# Patient Record
Sex: MALE | Race: WHITE | NOT HISPANIC OR LATINO | URBAN - METROPOLITAN AREA
[De-identification: names, ages, dates, MRNs, and addresses within clinical notes are randomized per-mention and may not be internally consistent; named-entity substitution may affect disease eponyms.]

---

## 2020-04-01 ENCOUNTER — OUTPATIENT (OUTPATIENT)
Dept: OUTPATIENT SERVICES | Facility: HOSPITAL | Age: 50
LOS: 1 days | End: 2020-04-01
Payer: MEDICAID

## 2020-04-01 PROCEDURE — G9001: CPT

## 2020-04-16 ENCOUNTER — EMERGENCY (EMERGENCY)
Facility: HOSPITAL | Age: 50
LOS: 1 days | Discharge: HOME | End: 2020-04-16
Admitting: EMERGENCY MEDICINE
Payer: COMMERCIAL

## 2020-04-16 VITALS
TEMPERATURE: 98 F | WEIGHT: 164.91 LBS | HEART RATE: 63 BPM | HEIGHT: 70 IN | SYSTOLIC BLOOD PRESSURE: 147 MMHG | DIASTOLIC BLOOD PRESSURE: 87 MMHG | RESPIRATION RATE: 20 BRPM | OXYGEN SATURATION: 97 %

## 2020-04-16 VITALS
HEART RATE: 58 BPM | OXYGEN SATURATION: 98 % | TEMPERATURE: 98 F | RESPIRATION RATE: 18 BRPM | SYSTOLIC BLOOD PRESSURE: 117 MMHG | DIASTOLIC BLOOD PRESSURE: 67 MMHG

## 2020-04-16 LAB — SARS-COV-2 RNA SPEC QL NAA+PROBE: DETECTED

## 2020-04-16 PROCEDURE — 87635 SARS-COV-2 COVID-19 AMP PRB: CPT

## 2020-04-16 PROCEDURE — 99283 EMERGENCY DEPT VISIT LOW MDM: CPT

## 2020-04-16 PROCEDURE — 99284 EMERGENCY DEPT VISIT MOD MDM: CPT

## 2020-04-16 RX ORDER — METHADONE HYDROCHLORIDE 40 MG/1
100 TABLET ORAL ONCE
Refills: 0 | Status: DISCONTINUED | OUTPATIENT
Start: 2020-04-16 | End: 2020-04-16

## 2020-04-16 RX ORDER — ACETAMINOPHEN 500 MG
650 TABLET ORAL ONCE
Refills: 0 | Status: COMPLETED | OUTPATIENT
Start: 2020-04-16 | End: 2020-04-16

## 2020-04-16 RX ADMIN — Medication 650 MILLIGRAM(S): at 10:57

## 2020-04-16 RX ADMIN — METHADONE HYDROCHLORIDE 100 MILLIGRAM(S): 40 TABLET ORAL at 13:44

## 2020-04-16 NOTE — ED ADULT TRIAGE NOTE - CHIEF COMPLAINT QUOTE
pt states " im here because the methadone program wants me to be tested for covid, other wise they won't see me" pt denies any cough, fever, chills.

## 2020-04-16 NOTE — ED PROVIDER NOTE - OBJECTIVE STATEMENT
51 y/o M with PMHx opioid abuse, on methadone maintenance 100mg, undomiciled, presenting to the ED requesting COVID-19 testing. Pt states he lives in a homeless shelter and states he went to the methadone clinic, but they will not administer his meds until he receives COVID-19 testing, as well as negative results. Pt is unable to receive methadone to go home with because he lives in a shelter. Pt is worried about withdrawal symptoms, but is not experiencing any at this time. He states his anxiety has worsened, and his last dose was 2 days ago. Denies fever, chills, cough, SOB, chest pain, abdominal pain, nausea, vomiting, diarrhea.

## 2020-04-16 NOTE — ED ADULT NURSE NOTE - OBJECTIVE STATEMENT
Patient states she was sent by his methadone program for covid testing. He also states today will be his 3rd day without methadone. Patient currently aaox3. Walks with steady gait. No tremors noted. He states +chills and subjective fever x 2 days. Per patient "It feels like it's from not having my methadone." He denies cough, SOB, chest pain, dizziness.

## 2020-04-16 NOTE — ED PROVIDER NOTE - NSFOLLOWUPINSTRUCTIONS_ED_ALL_ED_FT
Methadone (Injection) (Injectable) - Med Essential Fact Sheet     Rx static image Methadone - (By injection)   Why this medicine is used:  Treats pain.    Contact a nurse or doctor right away if you have:  Extreme dizziness or weakness, shallow breathing, sweating, seizures, cold or clammy skin  Slow, fast, pounding, or uneven heartbeat, trouble breathing  Decrease in how much or how often you urinate, swelling  Dry mouth, increased thirst, muscle cramps, fainting, unusual bleeding, bruising       Common side effects:  Vision changes, headache or confusion, lightheadedness, dizziness, mood changes  Trouble sleeping, changes in menstrual cycle, problems having sex  Nausea, vomiting, constipation, or stomach pain, loss of appetite, weight gain  Skin rash or itching, warmth or redness in your face, neck, arms, or upper chest       © Copyright [employer] 2020     back to top                      © Copyright [employer] 2020 Rx static image Methadone - (By injection)   Why this medicine is used:  Treats pain.    Contact a nurse or doctor right away if you have:  Extreme dizziness or weakness, shallow breathing, sweating, seizures, cold or clammy skin  Slow, fast, pounding, or uneven heartbeat, trouble breathing  Decrease in how much or how often you urinate, swelling  Dry mouth, increased thirst, muscle cramps, fainting, unusual bleeding, bruising       Common side effects:  Vision changes, headache or confusion, lightheadedness, dizziness, mood changes  Trouble sleeping, changes in menstrual cycle, problems having sex  Nausea, vomiting, constipation, or stomach pain, loss of appetite, weight gain  Skin rash or itching, warmth or redness in your face, neck, arms, or upper chest       © Copyright [employer] 2020     back to top                      © Copyright [employer] 2020

## 2020-04-16 NOTE — ED PROVIDER NOTE - PATIENT PORTAL LINK FT
You can access the FollowMyHealth Patient Portal offered by [facility] by registering at the following website: [email or web address]. By joining [email or web address]’s FollowMyHealth portal, you will also be able to view your health information using other applications (apps) compatible with our system.

## 2020-04-17 DIAGNOSIS — Z71.89 OTHER SPECIFIED COUNSELING: ICD-10-CM

## 2020-04-20 DIAGNOSIS — Z00.8 ENCOUNTER FOR OTHER GENERAL EXAMINATION: ICD-10-CM

## 2020-05-03 ENCOUNTER — EMERGENCY (EMERGENCY)
Facility: HOSPITAL | Age: 50
LOS: 1 days | Discharge: HOME | End: 2020-05-03
Attending: EMERGENCY MEDICINE | Admitting: EMERGENCY MEDICINE
Payer: MEDICAID

## 2020-05-03 VITALS
SYSTOLIC BLOOD PRESSURE: 116 MMHG | DIASTOLIC BLOOD PRESSURE: 83 MMHG | HEART RATE: 93 BPM | TEMPERATURE: 98 F | WEIGHT: 164.91 LBS | OXYGEN SATURATION: 95 % | RESPIRATION RATE: 18 BRPM

## 2020-05-03 PROCEDURE — 99283 EMERGENCY DEPT VISIT LOW MDM: CPT

## 2020-05-03 PROCEDURE — 99284 EMERGENCY DEPT VISIT MOD MDM: CPT

## 2020-05-03 PROCEDURE — 82962 GLUCOSE BLOOD TEST: CPT

## 2020-05-03 RX ADMIN — Medication 50 MILLIGRAM(S): at 18:01

## 2020-05-03 NOTE — ED PROVIDER NOTE - PATIENT PORTAL LINK FT
You can access the FollowMyHealth Patient Portal offered by [facility] by registering at the following website: [email or web address]. By joining [employer]’s FollowMyHealth portal, you will also be able to view your health information using other applications (apps) compatible with our system.

## 2020-05-03 NOTE — ED PROVIDER NOTE - CLINICAL SUMMARY MEDICAL DECISION MAKING FREE TEXT BOX
pt chronic alcoholic, drank 4 beers today - states usually drinks 1pint/day, wanting detox info, no objective findings of withdrawal - no n/v/d, no tachycardia or hypertension, no si/hi, denies drug use but admits to methadone, given librium to prevent withdrawal symptoms and given detox info, fs stable, no signs of trauma, no indication for any imaging or labs at this time, pt understands and agrees w/plan, strict return precautions given

## 2020-05-03 NOTE — ED PROVIDER NOTE - ENMT, MLM
Airway patent, Nasal mucosa clear. Mouth with normal mucosa. Throat has no vesicles, no oropharyngeal exudates and uvula is midline. no tongue fasciculations

## 2020-05-03 NOTE — ED PROVIDER NOTE - PROGRESS NOTE DETAILS
after dc - pt then demanding  help "to get some money" - explained that is not an option from the ed, then wanting admission to a hosp because homeless, pt escalating and becoming belligerent and aggressive towards staff, disruptive to surrounding patients hence escorted out by security

## 2020-05-03 NOTE — ED PROVIDER NOTE - ATTENDING CONTRIBUTION TO CARE
51yo M last drink few hours ago, alcoholic, here for concern for development of withdrawal (denies symptoms yet but wants librium to prevent) and detox info.   vss, well appearing, disheveled, no tongue fasciculations, no tremors, ambulatory without issue.  will give info and dose of librium and dc.

## 2020-05-03 NOTE — ED PROVIDER NOTE - OBJECTIVE STATEMENT
The pt is a 50 M, who presents to ED requesting detox info and states "I just wanted to make sure I'm not in withdrawal" - had 4 beers this am, states that usually drinks 1pint/day, states "i'm nervous". Denies cp, sob, n/v/d, abd pain, h/a, tremors, h/a, falls.

## 2020-05-04 PROBLEM — F11.10 OPIOID ABUSE, UNCOMPLICATED: Chronic | Status: ACTIVE | Noted: 2020-04-16

## 2020-05-07 DIAGNOSIS — R41.82 ALTERED MENTAL STATUS, UNSPECIFIED: ICD-10-CM

## 2020-05-07 DIAGNOSIS — F10.10 ALCOHOL ABUSE, UNCOMPLICATED: ICD-10-CM

## 2020-10-31 ENCOUNTER — EMERGENCY (EMERGENCY)
Facility: HOSPITAL | Age: 50
LOS: 1 days | Discharge: HOME | End: 2020-10-31
Attending: EMERGENCY MEDICINE | Admitting: EMERGENCY MEDICINE
Payer: MEDICAID

## 2020-10-31 VITALS
SYSTOLIC BLOOD PRESSURE: 138 MMHG | TEMPERATURE: 98 F | HEART RATE: 90 BPM | WEIGHT: 160.06 LBS | DIASTOLIC BLOOD PRESSURE: 88 MMHG | OXYGEN SATURATION: 99 % | HEIGHT: 70 IN | RESPIRATION RATE: 18 BRPM

## 2020-10-31 DIAGNOSIS — I10 ESSENTIAL (PRIMARY) HYPERTENSION: ICD-10-CM

## 2020-10-31 DIAGNOSIS — M79.674 PAIN IN RIGHT TOE(S): ICD-10-CM

## 2020-10-31 DIAGNOSIS — Z79.899 OTHER LONG TERM (CURRENT) DRUG THERAPY: ICD-10-CM

## 2020-10-31 PROCEDURE — 73630 X-RAY EXAM OF FOOT: CPT | Mod: 26

## 2020-10-31 PROCEDURE — 73630 X-RAY EXAM OF FOOT: CPT | Mod: 26,RT

## 2020-10-31 PROCEDURE — 73630 X-RAY EXAM OF FOOT: CPT

## 2020-10-31 PROCEDURE — 99283 EMERGENCY DEPT VISIT LOW MDM: CPT | Mod: 25

## 2020-10-31 PROCEDURE — 99283 EMERGENCY DEPT VISIT LOW MDM: CPT

## 2020-10-31 RX ORDER — METHADONE HYDROCHLORIDE 40 MG/1
100 TABLET ORAL
Qty: 0 | Refills: 0 | DISCHARGE

## 2020-10-31 RX ORDER — IBUPROFEN 200 MG
600 TABLET ORAL ONCE
Refills: 0 | Status: COMPLETED | OUTPATIENT
Start: 2020-10-31 | End: 2020-10-31

## 2020-10-31 RX ORDER — GABAPENTIN 400 MG/1
0 CAPSULE ORAL
Qty: 0 | Refills: 0 | DISCHARGE

## 2020-10-31 RX ORDER — IBUPROFEN 200 MG
1 TABLET ORAL
Qty: 30 | Refills: 0
Start: 2020-10-31 | End: 2020-11-09

## 2020-10-31 RX ADMIN — Medication 600 MILLIGRAM(S): at 09:26

## 2020-10-31 RX ADMIN — Medication 600 MILLIGRAM(S): at 10:09

## 2020-10-31 NOTE — ED ADULT TRIAGE NOTE - OTHER COMPLAINTS
c/o of right foot pain x 2 week.  pt thinks its more of the toe but not sure.   denies injury, fever, chills.  NO hx of diabetes

## 2020-10-31 NOTE — ED PROVIDER NOTE - PHYSICAL EXAMINATION
General Appearance: Patient is well developed and well nourished. Patient is lying in stretcher, not diaphoretic and does not appear in acute distress.      Pulm: Chest expansion symmetric bilaterally without evidence of retraction.       MSK: No noted tenderness to palpation of the medial/lateral malleolous, anterior joint. No tenderness to palpation of the tarsals, metatarsals or phalanges of the foot. No evidence of ecchymosis, deformity or edema. Full ROM ankle flexion, extension, inversion and eversion. Full ROM hip and knee.      Neuro: Distal sensation intact in arms and legs bilaterally. Sensory Knee and ankle reflexes 2+ and symmetric bilaterally. gait steady. gcs 15    psych: mood and affect appropriate.     skin: warm dry and intact- no note of erythema edema purpura petechia ecchymosis

## 2020-10-31 NOTE — ED ADULT NURSE NOTE - OBJECTIVE STATEMENT
+right fifth toe pain x 2 weeks. Pt is ambulatory, reports neuropathy to bilateral feet but ran out of his gabapentin "awhile ago". +pedal pulses, cap refill <3 seconds
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
## 2020-10-31 NOTE — ED PROVIDER NOTE - NSFOLLOWUPINSTRUCTIONS_ED_ALL_ED_FT
ARTHRALGIA - AfterCare(R) Instructions(ER/ED)           Arthralgia    WHAT YOU NEED TO KNOW:    Arthralgia is pain in one or more joints, with no inflammation. It may be short-term and get better within 6 to 8 weeks. Arthralgia can be an early sign of arthritis. Arthralgia may be caused by a medical condition, such as a hormone disorder or a tumor. It may also be caused by an infection or injury.     DISCHARGE INSTRUCTIONS:    Medicines: The following medicines may be ordered for you:   •Acetaminophen decreases pain. Ask how much to take and how often to take it. Follow directions. Acetaminophen can cause liver damage if not taken correctly.      •NSAIDs decrease pain and prevent swelling. Ask your healthcare provider which medicine is right for you. Ask how much to take and when to take it. Take as directed. NSAIDs can cause stomach bleeding and kidney problems if not taken correctly.       •Pain relief cream decreases pain. Use this cream as directed.      •Take your medicine as directed. Contact your healthcare provider if you think your medicine is not helping or if you have side effects. Tell him of her if you are allergic to any medicine. Keep a list of the medicines, vitamins, and herbs you take. Include the amounts, and when and why you take them. Bring the list or the pill bottles to follow-up visits. Carry your medicine list with you in case of an emergency.      Follow up with your healthcare provider or specialist as directed: Write down your questions so you remember to ask them during your visits.     Self-care:   •Apply heat to help decrease pain. Use a heating pad or heat wrap. Apply heat for 20 to 30 minutes every 2 hours for as many days as directed.       •Rest as much as possible. Avoid activities that cause joint pain.       •Apply ice to help decrease swelling and pain. Ice may also help prevent tissue damage. Use an ice pack, or put crushed ice in a plastic bag. Cover it with a towel and place it on your painful joint for 15 to 20 minutes every hour or as directed.       •Support the joint with a brace or elastic wrap as directed.       •Elevate your joint above the level of your heart as often as you can to help decrease swelling and pain. Prop your painful joint on pillows or blankets to keep it elevated comfortably.       •Lose weight if you are overweight. Extra weight can put pressure on your joints and cause more pain. Ask your healthcare provider how much you should weigh. Ask him to help you create a weight loss plan.       •Exercise regularly to help improve joint movement and to decrease pain. Ask about the best exercise plan for you. Low-impact exercises can help take the pressure off your joints. Examples are walking, swimming, and water aerobics.       Physical therapy: A physical therapist teaches you exercises to help improve movement and strength, and to decrease pain. Ask your healthcare provider if physical therapy is right for you.    Contact your healthcare provider or specialist if:   •You have a fever.       •You continue to have joint pain that cannot be relieved with heat, ice, or medicine.      •You have pain and inflammation around your joint.      •You have questions or concerns about your condition or care.      Return to the emergency department if:   •You have sudden, severe pain when you move your joint.       •You have a fever and shaking chills.      •You cannot move your joint.      •You lose feeling on the side of your body where you have the painful joint.          © Copyright [employer] 2020           back to top                          © Copyright [employer] 2020

## 2020-10-31 NOTE — ED PROVIDER NOTE - PATIENT PORTAL LINK FT
You can access the FollowMyHealth Patient Portal offered by [facility] by registering at the following website: [email or web address]. By joining [email or web address]’s FollowMyHealth portal, you will also be able to view your health information using other applications (apps) compatible with our system.

## 2020-10-31 NOTE — ED PROVIDER NOTE - DIAGNOSTIC INTERPRETATION
ER Physician Assistant: right foot (Reviewed with radiology)  INTERPRETATION: no acute fracture; no soft tissue swelling noted; normal bony alignment.
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
## 2020-10-31 NOTE — ED PROVIDER NOTE - MDM ORDERS SUBMITTED SELECTION
Medications/Imaging Studies no abdominal distension/no hematuria/no vomiting/no chills/no blood in stool/no dysuria/no diarrhea/no fever/no burning urination/no nausea

## 2020-12-14 ENCOUNTER — EMERGENCY (EMERGENCY)
Facility: HOSPITAL | Age: 50
LOS: 1 days | Discharge: HOME | End: 2020-12-14
Attending: EMERGENCY MEDICINE | Admitting: EMERGENCY MEDICINE
Payer: MEDICAID

## 2020-12-14 VITALS
RESPIRATION RATE: 18 BRPM | DIASTOLIC BLOOD PRESSURE: 96 MMHG | WEIGHT: 160.06 LBS | OXYGEN SATURATION: 98 % | HEIGHT: 70 IN | TEMPERATURE: 98 F | HEART RATE: 82 BPM | SYSTOLIC BLOOD PRESSURE: 148 MMHG

## 2020-12-14 VITALS
SYSTOLIC BLOOD PRESSURE: 132 MMHG | HEART RATE: 80 BPM | RESPIRATION RATE: 17 BRPM | TEMPERATURE: 98 F | OXYGEN SATURATION: 99 % | DIASTOLIC BLOOD PRESSURE: 88 MMHG

## 2020-12-14 DIAGNOSIS — F32.9 MAJOR DEPRESSIVE DISORDER, SINGLE EPISODE, UNSPECIFIED: ICD-10-CM

## 2020-12-14 DIAGNOSIS — I10 ESSENTIAL (PRIMARY) HYPERTENSION: ICD-10-CM

## 2020-12-14 DIAGNOSIS — F31.9 BIPOLAR DISORDER, UNSPECIFIED: ICD-10-CM

## 2020-12-14 DIAGNOSIS — Z20.828 CONTACT WITH AND (SUSPECTED) EXPOSURE TO OTHER VIRAL COMMUNICABLE DISEASES: ICD-10-CM

## 2020-12-14 DIAGNOSIS — Z79.899 OTHER LONG TERM (CURRENT) DRUG THERAPY: ICD-10-CM

## 2020-12-14 DIAGNOSIS — F17.200 NICOTINE DEPENDENCE, UNSPECIFIED, UNCOMPLICATED: ICD-10-CM

## 2020-12-14 DIAGNOSIS — L29.9 PRURITUS, UNSPECIFIED: ICD-10-CM

## 2020-12-14 LAB
AMPHET UR-MCNC: POSITIVE
ANION GAP SERPL CALC-SCNC: 14 MMOL/L — SIGNIFICANT CHANGE UP (ref 5–17)
APAP SERPL-MCNC: <5 UG/ML — LOW (ref 10–30)
APPEARANCE UR: CLEAR — SIGNIFICANT CHANGE UP
BACTERIA # UR AUTO: PRESENT /HPF
BARBITURATES UR SCN-MCNC: NEGATIVE — SIGNIFICANT CHANGE UP
BASOPHILS # BLD AUTO: 0.04 K/UL — SIGNIFICANT CHANGE UP (ref 0–0.2)
BASOPHILS NFR BLD AUTO: 0.6 % — SIGNIFICANT CHANGE UP (ref 0–2)
BENZODIAZ UR-MCNC: POSITIVE
BILIRUB UR-MCNC: NEGATIVE — SIGNIFICANT CHANGE UP
BUN SERPL-MCNC: 10 MG/DL — SIGNIFICANT CHANGE UP (ref 7–23)
CALCIUM SERPL-MCNC: 9.4 MG/DL — SIGNIFICANT CHANGE UP (ref 8.4–10.5)
CHLORIDE SERPL-SCNC: 100 MMOL/L — SIGNIFICANT CHANGE UP (ref 96–108)
CO2 SERPL-SCNC: 27 MMOL/L — SIGNIFICANT CHANGE UP (ref 22–31)
COCAINE METAB.OTHER UR-MCNC: POSITIVE
COD CRY URNS QL: ABNORMAL /HPF
COLOR SPEC: YELLOW — SIGNIFICANT CHANGE UP
CREAT SERPL-MCNC: 0.73 MG/DL — SIGNIFICANT CHANGE UP (ref 0.5–1.3)
DIFF PNL FLD: ABNORMAL
EOSINOPHIL # BLD AUTO: 0.27 K/UL — SIGNIFICANT CHANGE UP (ref 0–0.5)
EOSINOPHIL NFR BLD AUTO: 4 % — SIGNIFICANT CHANGE UP (ref 0–6)
EPI CELLS # UR: SIGNIFICANT CHANGE UP /HPF (ref 0–5)
ETHANOL SERPL-MCNC: <10 MG/DL — SIGNIFICANT CHANGE UP (ref 0–10)
GLUCOSE SERPL-MCNC: 75 MG/DL — SIGNIFICANT CHANGE UP (ref 70–99)
GLUCOSE UR QL: NEGATIVE — SIGNIFICANT CHANGE UP
HCT VFR BLD CALC: 41.9 % — SIGNIFICANT CHANGE UP (ref 39–50)
HGB BLD-MCNC: 13 G/DL — SIGNIFICANT CHANGE UP (ref 13–17)
IMM GRANULOCYTES NFR BLD AUTO: 0.3 % — SIGNIFICANT CHANGE UP (ref 0–1.5)
KETONES UR-MCNC: NEGATIVE — SIGNIFICANT CHANGE UP
LEUKOCYTE ESTERASE UR-ACNC: NEGATIVE — SIGNIFICANT CHANGE UP
LYMPHOCYTES # BLD AUTO: 2.18 K/UL — SIGNIFICANT CHANGE UP (ref 1–3.3)
LYMPHOCYTES # BLD AUTO: 32.2 % — SIGNIFICANT CHANGE UP (ref 13–44)
MCHC RBC-ENTMCNC: 28.2 PG — SIGNIFICANT CHANGE UP (ref 27–34)
MCHC RBC-ENTMCNC: 31 GM/DL — LOW (ref 32–36)
MCV RBC AUTO: 90.9 FL — SIGNIFICANT CHANGE UP (ref 80–100)
METHADONE UR-MCNC: POSITIVE
MONOCYTES # BLD AUTO: 0.57 K/UL — SIGNIFICANT CHANGE UP (ref 0–0.9)
MONOCYTES NFR BLD AUTO: 8.4 % — SIGNIFICANT CHANGE UP (ref 2–14)
NEUTROPHILS # BLD AUTO: 3.69 K/UL — SIGNIFICANT CHANGE UP (ref 1.8–7.4)
NEUTROPHILS NFR BLD AUTO: 54.5 % — SIGNIFICANT CHANGE UP (ref 43–77)
NITRITE UR-MCNC: NEGATIVE — SIGNIFICANT CHANGE UP
NRBC # BLD: 0 /100 WBCS — SIGNIFICANT CHANGE UP (ref 0–0)
OPIATES UR-MCNC: NEGATIVE — SIGNIFICANT CHANGE UP
PCP SPEC-MCNC: SIGNIFICANT CHANGE UP
PCP UR-MCNC: NEGATIVE — SIGNIFICANT CHANGE UP
PH UR: 5.5 — SIGNIFICANT CHANGE UP (ref 5–8)
PLATELET # BLD AUTO: 183 K/UL — SIGNIFICANT CHANGE UP (ref 150–400)
POTASSIUM SERPL-MCNC: 3.7 MMOL/L — SIGNIFICANT CHANGE UP (ref 3.5–5.3)
POTASSIUM SERPL-SCNC: 3.7 MMOL/L — SIGNIFICANT CHANGE UP (ref 3.5–5.3)
PROT UR-MCNC: NEGATIVE MG/DL — SIGNIFICANT CHANGE UP
RBC # BLD: 4.61 M/UL — SIGNIFICANT CHANGE UP (ref 4.2–5.8)
RBC # FLD: 13.4 % — SIGNIFICANT CHANGE UP (ref 10.3–14.5)
RBC CASTS # UR COMP ASSIST: < 5 /HPF — SIGNIFICANT CHANGE UP
SALICYLATES SERPL-MCNC: <0.3 MG/DL — LOW (ref 2.8–20)
SODIUM SERPL-SCNC: 141 MMOL/L — SIGNIFICANT CHANGE UP (ref 135–145)
SP GR SPEC: >=1.03 — SIGNIFICANT CHANGE UP (ref 1–1.03)
THC UR QL: NEGATIVE — SIGNIFICANT CHANGE UP
UROBILINOGEN FLD QL: 0.2 E.U./DL — SIGNIFICANT CHANGE UP
WBC # BLD: 6.77 K/UL — SIGNIFICANT CHANGE UP (ref 3.8–10.5)
WBC # FLD AUTO: 6.77 K/UL — SIGNIFICANT CHANGE UP (ref 3.8–10.5)
WBC UR QL: < 5 /HPF — SIGNIFICANT CHANGE UP

## 2020-12-14 PROCEDURE — 81001 URINALYSIS AUTO W/SCOPE: CPT

## 2020-12-14 PROCEDURE — 86769 SARS-COV-2 COVID-19 ANTIBODY: CPT

## 2020-12-14 PROCEDURE — 99285 EMERGENCY DEPT VISIT HI MDM: CPT

## 2020-12-14 PROCEDURE — 85025 COMPLETE CBC W/AUTO DIFF WBC: CPT

## 2020-12-14 PROCEDURE — 80307 DRUG TEST PRSMV CHEM ANLYZR: CPT

## 2020-12-14 PROCEDURE — 80048 BASIC METABOLIC PNL TOTAL CA: CPT

## 2020-12-14 PROCEDURE — U0003: CPT

## 2020-12-14 PROCEDURE — 36415 COLL VENOUS BLD VENIPUNCTURE: CPT

## 2020-12-14 PROCEDURE — 93005 ELECTROCARDIOGRAM TRACING: CPT

## 2020-12-14 RX ORDER — PERMETHRIN CREAM 5% W/W 50 MG/G
1 CREAM TOPICAL ONCE
Refills: 0 | Status: COMPLETED | OUTPATIENT
Start: 2020-12-14 | End: 2020-12-14

## 2020-12-14 RX ADMIN — PERMETHRIN CREAM 5% W/W 1 APPLICATION(S): 50 CREAM TOPICAL at 22:28

## 2020-12-14 NOTE — ED PROVIDER NOTE - NSFOLLOWUPINSTRUCTIONS_ED_ALL_ED_FT
Please follow up with your psychiatrist at clinic tomorrow as scheduled    Substance Abuse    Chemical dependency is an addiction to drugs or alcohol. It is characterized by the repeated behavior of seeking out and using drugs and alcohol despite harmful consequences to the health and safety of oneself and others. Using drugs in a manner that brought you to an Emergency Room suggests you may have an drug abuse problem. Seek help at a drug addiction center.    SEEK IMMEDIATE MEDICAL CARE IF YOU HAVE ANY OF THE FOLLOWING SYMPTOMS: chest pain, shortness of breath, change in mental status, thoughts about hurting killing yourself, thoughts about hurting or killing somebody else, hallucinations, or worsening depression.

## 2020-12-14 NOTE — ED ADULT NURSE NOTE - OBJECTIVE STATEMENT
51 y/o undomiciled male presents to ED reporting feeling depressed, overwhelmed and anxious. States he has not been prescribed his normal psychiatric medications for >1 year, that he tried to see a doctor before covid pandemic and has been unable to schedule subsequent appointments since. Denies SI/HI, audio/visual hallucinations, or feeling unsafe. Pt also endorses head and body lice on arrival. Taken to decon room for shower and provided with permethrin.

## 2020-12-14 NOTE — ED BEHAVIORAL HEALTH ASSESSMENT NOTE - HPI (INCLUDE ILLNESS QUALITY, SEVERITY, DURATION, TIMING, CONTEXT, MODIFYING FACTORS, ASSOCIATED SIGNS AND SYMPTOMS)
Patient is a 51yo  male, undomiciled vs living with friends, 10yo child who lives with mother, unemployed, with PPH of PTSD, bipolar, polysubstance abuse (opioid on methadone, benzos, cocaine, amphetamines), one reported Patient is a 49yo  male, undomiciled vs living with friends, 10yo child who lives with mother, unemployed, with PPH of PTSD, bipolar, polysubstance abuse (opioid on methadone, benzos, cocaine, amphetamines), one reported past inpatient admission, no current outpatient tx, currently in [facility], bno reported past SAs or self harm, past detox/rehab stays, no hx of known violence, past arrests for drug related charges, with PMH of hepatitis C. He brings self in for worsening depression.     Patient reports feeling overwhelmed and looking for help and guidance about future med switching. He is currently in [facility] on methadone and considering switching to suboxone and going back on psych meds. He also reports feeling excited about moving back in with his family. He denies any mood symptoms or depression, states he feels very happy. He states he has a meeting in the morning with [facility] to discuss suboxone switch, outpatient psych referrals. He describes chronic insomnia and self isolating at home but otherwise no mood or psych sx. Denies SI/P/I, hx of SAs or self harm, HI/P/I, aggressive I/P/I, AVH, paranoia, delusions. Denies substance use other than alcohol once a week. He has upcoming court dates for drug related misdemeanors. No access to guns/weapons. He is not interested in voluntary and states he does not further outpatient referrals as he is obtaining them through his program.     No collateral available

## 2020-12-14 NOTE — ED PROVIDER NOTE - PROGRESS NOTE DETAILS
telepsych consulted pt eval by telepsych, retracting all statements and reports has outpt f/u tomorrow, recommending dc.  pt dc in stable condition.  discussed strict return parameters

## 2020-12-14 NOTE — ED PROVIDER NOTE - PATIENT PORTAL LINK FT
You can access the FollowMyHealth Patient Portal offered by [facility] by registering at the following website: [email or web address]. By joining [employer]’s FollowMyHealth portal, you will also be able to view your health information using other applications (apps) compatible with our system.

## 2020-12-14 NOTE — ED BEHAVIORAL HEALTH ASSESSMENT NOTE - SAFETY PLAN ADDT'L DETAILS
Provision of National Suicide Prevention Lifeline 8-033-550-TALK (2842)/Education provided regarding environmental safety / lethal means restriction

## 2020-12-14 NOTE — ED ADULT NURSE REASSESSMENT NOTE - NS ED NURSE REASSESS COMMENT FT1
Orders received, labs, nasopharyngeal swab and urine collected, sent to lab, waiting results, continuous close 1:1 observation of pt maintained,

## 2020-12-14 NOTE — ED BEHAVIORAL HEALTH ASSESSMENT NOTE - DESCRIPTION
see bh note    Vital Signs Last 24 Hrs  T(C): 36.9 (14 Dec 2020 18:22), Max: 36.9 (14 Dec 2020 18:22)  T(F): 98.4 (14 Dec 2020 18:22), Max: 98.4 (14 Dec 2020 18:22)  HR: 82 (14 Dec 2020 18:22) (82 - 82)  BP: 148/96 (14 Dec 2020 18:22) (148/96 - 148/96)  BP(mean): --  RR: 18 (14 Dec 2020 18:22) (18 - 18)  SpO2: 98% (14 Dec 2020 18:22) (98% - 98%) hepatitis C undomiciled vs living with friends; ; has 2 children (12yo and adult), unemployed

## 2020-12-14 NOTE — ED PROVIDER NOTE - OBJECTIVE STATEMENT
50 M undomiciled pmh depression, bipolar d/o off meds for approx 2 years p/w depression and feeling overwhelmed.  States he has been trying to see a psychiatrist since March and hasnt been able to get appointment.  Now w/ worsening depression, states he drinks occasionally to help, denies drug use.  Also thinks he has body lice 2/2 generalized itching.  Denies f/c, headache, dizziness, fainting, chest pain, sob, cough, uri sxs, abd pain, nvd, fall/trauma, SI/HI, delusions 50 M undomiciled pmh depression, bipolar d/o off meds, covid + in April for approx 2 years p/w depression and feeling overwhelmed.  States he has been trying to see a psychiatrist since March and hasnt been able to get appointment.  Now w/ worsening depression, states he drinks occasionally to help, denies drug use.  Also thinks he has body lice 2/2 generalized itching.  Denies f/c, headache, dizziness, fainting, chest pain, sob, cough, uri sxs, abd pain, nvd, fall/trauma, SI/HI, delusions 50 M undomiciled pmh depression, bipolar d/o off meds, covid + in April for approx 2 years p/w depression and feeling overwhelmed.  States he has been trying to see a psychiatrist since March and hasnt been able to get appointment.  Now w/ worsening depression, states he drinks occasionally to help, denies drug use.  Also thinks he has body lice 2/2 generalized itching.  Denies f/c, headache, dizziness, fainting, chest pain, sob, cough, uri sxs, abd pain, nvd, fall/trauma, SI/HI/AH/VH, delusions

## 2020-12-14 NOTE — ED PROVIDER NOTE - PHYSICAL EXAMINATION
Vitals reviewed  Gen: well appearing, nad, speaking in full sentences  Skin: wwp, no rash/lesions  HEENT: ncat, eomi, mmm  CV: rrr, no audible m/r/g  Resp: symmetrical expansion, ctab, no w/r/r  Abd: nondistended, soft/nt  Ext: FROM throughout, no peripheral edema  Neuro: alert/oriented, no focal deficits, steady gait pt decon for scabies prior to eval   Vitals reviewed  Gen: disheveled appearing, nad, speaking in full sentences  Skin: wwp, no rash/lesions  HEENT: ncat, eomi, mmm  CV: rrr, no audible m/r/g  Resp: unlabored breathing   Abd: nondistended, soft/nt  Ext: FROM throughout, no peripheral edema  Neuro: alert/oriented, no focal deficits, steady gait

## 2020-12-14 NOTE — ED BEHAVIORAL HEALTH ASSESSMENT NOTE - REFERRAL / APPOINTMENT DETAILS
Patient has telephone follow up tomorrow morning with [facility] and [facility]? to set up outpatient psychiatric services

## 2020-12-14 NOTE — ED BEHAVIORAL HEALTH ASSESSMENT NOTE - SUMMARY
Patient is a 51yo  male, undomiciled vs living with friends, 10yo child who lives with mother, unemployed, with PPH of PTSD, bipolar, polysubstance abuse (opioid on methadone, benzos, cocaine, amphetamines), one reported past inpatient admission, no current outpatient tx, currently in [facility], bno reported past SAs or self harm, past detox/rehab stays, no hx of known violence, past arrests for drug related charges, with PMH of hepatitis C. He brings self in for worsening depression.     Patient denies feeling depressed or any mood sx other than feeling overwhelmed. He is currently in [facility] on methadone and considering switching to suboxone and going back on psych meds. He reportedly has meetings in morning with [facility] to discuss meds and outpatient psych referrals. Denies SI/HI/gonzalo or psychotic s/sx. Utox positive for benzos, cocaine, amphetamines, methadone; denies use. He does not warrant inpatient admission and states he does not require further outpatient referrals as he is obtaining them through his program. Cleared for discharge

## 2020-12-14 NOTE — ED ADULT TRIAGE NOTE - CHIEF COMPLAINT QUOTE
Pt reports increasing depression over the past couple weeks. Pt states, "I have a lot going on and I can't seem to catch a break." Denies HI, SI, AH, VH, CP, SOB, NVD. pt also reports that he has body lice and cannot get rid of it. Pt to be taken to decon room.

## 2020-12-15 LAB — SARS-COV-2 RNA SPEC QL NAA+PROBE: SIGNIFICANT CHANGE UP

## 2020-12-16 LAB
SARS-COV-2 IGG SERPL QL IA: NEGATIVE — SIGNIFICANT CHANGE UP
SARS-COV-2 IGM SERPL IA-ACNC: 0.36 INDEX — SIGNIFICANT CHANGE UP

## 2021-02-18 ENCOUNTER — EMERGENCY (EMERGENCY)
Facility: HOSPITAL | Age: 51
LOS: 1 days | Discharge: LEFT BEFORE DISCHARGE | End: 2021-02-18
Admitting: EMERGENCY MEDICINE
Payer: MEDICAID

## 2021-02-18 VITALS
DIASTOLIC BLOOD PRESSURE: 73 MMHG | RESPIRATION RATE: 18 BRPM | SYSTOLIC BLOOD PRESSURE: 108 MMHG | HEIGHT: 70 IN | TEMPERATURE: 97 F | OXYGEN SATURATION: 99 % | HEART RATE: 93 BPM

## 2021-02-18 DIAGNOSIS — M79.605 PAIN IN LEFT LEG: ICD-10-CM

## 2021-02-18 PROCEDURE — L9991: CPT

## 2021-02-18 NOTE — ED ADULT NURSE NOTE - ED_CALLED_NO_RESPONSE_NOTE 3
Patient verbalized wanted to used the bathroom , been looking all the bathrooms patient is not around. As per registration they noticed patient walked out the door .

## 2021-12-10 NOTE — ED ADULT NURSE NOTE - NS ED NURSE DC INFO COMPLEXITY
Hide Additional Notes?: No Detail Level: Simple Detail Level: Generalized Detail Level: Zone Verbalized Understanding/Simple: Patient demonstrates quick and easy understanding

## 2022-01-08 NOTE — ED PROVIDER NOTE - NSFOLLOWUPINSTRUCTIONS_ED_ALL_ED_FT
Spontaneous, unlabored and symmetrical
FOLLOW UP WITH A DETOX CENTER - SEE ATTACHED LIST    Alcohol intoxication occurs when the amount of alcohol that a person has consumed impairs his or her ability to mentally and physically function. Chronic alcohol consumption can also lead to a variety of health issues including neurological disease, stomach disease, heart disease, liver disease, etc. Do not drive after drinking alcohol. Drinking enough alcohol to end up in an Emergency Room suggests you may have an alcohol abuse problem. Seek help at a drug addiction center.    SEEK IMMEDIATE MEDICAL CARE IF YOU HAVE ANY OF THE FOLLOWING SYMPTOMS: seizures, vomiting blood, blood in your stool, lightheadedness/dizziness, or becoming shaky to tremulous when you stop drinking.

## 2022-01-21 ENCOUNTER — EMERGENCY (EMERGENCY)
Facility: HOSPITAL | Age: 52
LOS: 1 days | Discharge: HOME | End: 2022-01-21
Admitting: EMERGENCY MEDICINE
Payer: MEDICAID

## 2022-01-21 VITALS
TEMPERATURE: 98 F | SYSTOLIC BLOOD PRESSURE: 156 MMHG | DIASTOLIC BLOOD PRESSURE: 77 MMHG | HEART RATE: 100 BPM | OXYGEN SATURATION: 98 % | HEIGHT: 70 IN | RESPIRATION RATE: 18 BRPM

## 2022-01-21 DIAGNOSIS — Z20.822 CONTACT WITH AND (SUSPECTED) EXPOSURE TO COVID-19: ICD-10-CM

## 2022-01-21 DIAGNOSIS — Z48.01 ENCOUNTER FOR CHANGE OR REMOVAL OF SURGICAL WOUND DRESSING: ICD-10-CM

## 2022-01-21 DIAGNOSIS — R68.89 OTHER GENERAL SYMPTOMS AND SIGNS: ICD-10-CM

## 2022-01-21 DIAGNOSIS — I10 ESSENTIAL (PRIMARY) HYPERTENSION: ICD-10-CM

## 2022-01-21 LAB — SARS-COV-2 RNA SPEC QL NAA+PROBE: SIGNIFICANT CHANGE UP

## 2022-01-21 PROCEDURE — U0003: CPT

## 2022-01-21 PROCEDURE — U0005: CPT

## 2022-01-21 PROCEDURE — 99284 EMERGENCY DEPT VISIT MOD MDM: CPT

## 2022-01-21 PROCEDURE — 99283 EMERGENCY DEPT VISIT LOW MDM: CPT

## 2022-01-21 RX ORDER — BACITRACIN ZINC 500 UNIT/G
1 OINTMENT IN PACKET (EA) TOPICAL ONCE
Refills: 0 | Status: DISCONTINUED | OUTPATIENT
Start: 2022-01-21 | End: 2022-01-25

## 2022-01-21 NOTE — ED ADULT NURSE NOTE - HIV OFFER
Pt given courtesy meal, changed into scrubs. Pt reports he is not suicidal, that the police misinterpreted what he was saying.    Previously Declined (within the last year)

## 2022-01-21 NOTE — ED ADULT TRIAGE NOTE - CHIEF COMPLAINT QUOTE
pt c/o L leg wound for 2 weeks, feeling cold all the time, eating a lot,  requesting covid test. hx hepatitis C

## 2022-01-21 NOTE — ED PROVIDER NOTE - PHYSICAL EXAMINATION
CONSTITUTIONAL: Well-developed; well-nourished; in no acute distress.  SKIN: Warm and dry, no acute rash.  HEAD: Normocephalic; atraumatic.  EYES: PERRL, EOM intact; conjunctiva and sclera clear.  ENT: No nasal discharge; airway clear.  NECK: Supple; non tender.  CARD: S1, S2 normal; no murmurs, gallops, or rubs. Regular rate and rhythm.  RESP: No wheezes, rales or rhonchi.  ABD: Normal bowel sounds; soft; non-distended; non-tender; no hepatosplenomegaly.  EXT: Normal ROM. No clubbing, cyanosis or edema. Left lower leg medially a 2cm healing wound with granulation present. No acute signs of any new infection, no cellulitis, and no drainage.   LYMPH: No acute cervical adenopathy.  NEURO: Alert, oriented. Grossly unremarkable.  PSYCH: Cooperative, appropriate. CONSTITUTIONAL: Well-developed; well-nourished; in no acute distress.  SKIN: Warm and dry, no acute rash.  HEAD: Normocephalic; atraumatic.  EYES: PERRL, EOM intact; conjunctiva and sclera clear.  ENT: No nasal discharge; airway clear.  NECK: Supple; non tender.  CARD: S1, S2 normal; no murmurs, gallops, or rubs. Regular rate and rhythm.  RESP: No wheezes, rales or rhonchi.  EXT: Normal ROM. No clubbing, cyanosis or edema. Left lower leg medially a 2cm healing wound with granulation present. No acute signs of any new infection, no cellulitis, and no drainage.   LYMPH: No acute cervical adenopathy.  NEURO: Alert, oriented. Grossly unremarkable.  PSYCH: Cooperative, appropriate.

## 2022-01-21 NOTE — ED PROVIDER NOTE - CLINICAL SUMMARY MEDICAL DECISION MAKING FREE TEXT BOX
Patient with no new symptoms concerning left lower leg. Well healing wound , no focal signs of new infection. Recommend daily wound care and referred to his clinic.

## 2022-01-21 NOTE — ED PROVIDER NOTE - OBJECTIVE STATEMENT
50 y/o M with a PMHX of Hep C and anemia presents to the ED with a cc of feeling cold and concern about left lower leg wound. Pt states wound been there for several weeks and wanted to get it checked. Pt also wanted to get covid swabbed. Denies any cp, sob, fever chills or any other co symptoms. 50 y/o M with a PMHX of Hep C and anemia presents to the ED with a cc of feeling cold and concern about left lower leg wound. Pt states wound has been there for several weeks and wanted to get it checked. Pt also wanted to get covid swabbed. Denies any cp, sob, fever chills or any other co symptoms. Patient report of new symptoms about his lower leg.

## 2022-01-21 NOTE — ED ADULT NURSE NOTE - OBJECTIVE STATEMENT
Presents for c/o RLE pain at site of healed wound, no active bleeding or deformity. Also notes is cold outside, undomiciled. Req covid Pt presents to ED for Covid swab. Denies symptoms- no CP/SOB/fevers/weakness/cough/known exposure. Denies CP/SOB/weakness/dizziness/tingling/cough/fevers/known sick contacts.

## 2022-01-21 NOTE — ED ADULT TRIAGE NOTE - TEMPERATURE IN CELSIUS (DEGREES C)
Reason for visit: Follow-up PAF    Impression:   · Paroxysmal atrial fibrillation  ? Symptomatic with palpitations and intermittent nausea/flushing  ? Diagnosed 7/20/17  ? Admission 3/25/19: Presented to ED that AM, found to be in AF, placed on IV diltiazem and spontaneously converted to SR. Initially seen by EP as an inpatient. Initiated on Flecainide with Atenolol.   ? Maintaining sinus rhythm on Flecainide  · High risk medication use on Flecainide 100 mg BID  ? Takes with atenolol 12.5 mg nightly, heart rates 50-60's  · CHADSVASc 1 (HTN)  · On Aspirin 162 mg daily for stroke prevention   · Other medical history   ? Cerebral palsy, utilizes bilateral crutches  ? Sleep apnea  ? GERD  ? HTN  · ECG/Cardiac Monitors  ? ECG 7/31/19: SR, QRS 88 ms, QTc 438 ms  ? ECG 4/26/19: SR with first-degree AV block, QRS 88 ms  ? ECG 3/25/19: AF with RVR   ? ECG 3/23/19: ST   ? ECG 7/20/17 at 23:45: NSR  ? ECG 7/20/17 at 22:33: AFL with RVR  ? ECG 7/20/17 at 21:37: AF with RVR  · Cardiac imaging  ? Echo 3/25/19: EF 70%, IVS 1.2, LVPW 1.1, DUDLEY 15.7 ml/m², RVSP mmHg   ? Echo 7/31/17: EF >70%  ? NM stress 9/11/18: No evidence of ischemia or infarction  · Labs  ? Magnesium 1.9 (4/29/19)  ? Cr 0.85, GFR >90, K+ 3.7, Mag 1.9 (3/25/19)   ? TSH 3.952 (7/20/17)     Recommendations:   · Continue atenolol and flecainide for rhythm control strategy, as well as aspirin. At this time keep atenolol at 12.5 mg daily due to borderline bradycardia. No medication changes from EP standpoint.  · Blood pressure elevated at today's visit, will send my note to Dr. Archuleta's office. (/100 on arrival, 160/85 with retake. At the end of visit /76).  · Follow-up in 6 months with mid-level provider.  · Call EP clinic for any issues or concerns.    HPI:  This is a 59-year-old male with PMH of TIANNA, hypertension, hyperlipidemia, and cerebral palsy, seen in the clinic today for follow-up of paroxysmal atrial fibrillation, managed on flecainide 100 mg  twice daily. He is also taking atenolol 12.5 mg nightly. On aspirin for anticoagulation. ECG today shows sinus rhythm.    Reports he is doing well and not has not had any episodes of atrial fibrillation since last March. Unable to tell me exactly what blood pressures are running at home, he thinks systolic range likely in the 130's, although he thought 1 reading was 172/70. No longer having diarrhea, magnesium level was stable in April. Denies chest pain, palpitations, shortness of breath, lightheadedness, dizziness, and lower extremity swelling. Has some sinus issues with nasal congestion and drainage. Reports having corticosteroid injection for back with briefly elevated blood pressures and heart rates over 100 bpm. Did not have any symptoms with elevated heart rate. Discussed atrial fibrillation, that is a chronic disease, as well as flecainide. He was concerned that flecainide could have long-term effects on the body, and this was discussed in detail, aware that stress test is generally recommended every 5 years, as flecainide is contraindicated in patients with coronary artery disease. Unaccompanied at the visit today.    PAST MEDICAL HX:    HTN (hypertension)                                            CP (cerebral palsy) (CMS/[facility])                                 High cholesterol                                              Sleep apnea                                                   Gastroesophageal reflux disease                               Arthritis                                                     Fracture                                                      Allergies and Medications were reviewed    ROS:  Pertinent items noted in history of present illness, all remaining items were reviewed and are negative.    PHYSICAL EXAM:  Visit Vitals  BP (!) 180/100   Pulse 73   Resp 20   Ht 5' 11\" (1.803 m)   Wt 124.7 kg   SpO2 95%   BMI 38.35 kg/m²     GENERAL:  Cooperative, sitting comfortably, in no acute  distress.  HEAD: Normocephalic, Non-traumatic  NECK: Trachea midline  CARDIOVASCULAR:   Regular rate and rhythm, normal S1/S2, no murmur.   RESPIRATORY:  Clear to ausculation bilaterally. No wheezes, rale or rhonchi.  EXTREMITIES:  No edema bilateral lower extremities.  NEURO: No obvious motor or sensor deficits  PSYCHIATRIC:   Alert and oriented to person, place, and time.  INTEGUMENTARY:  Warm and dry.    I have personally reviewed and interpreted EKG.  I have reviewed and summarized old records as noted in the impression section.    EKG:  SR, QRS 88 ms, QTc 438 ms    Labs:   Lab Results   Component Value Date    WBC 7.4 03/25/2019    WBC 10.3 03/23/2019    HGB 16.0 03/25/2019    HGB 15.5 03/23/2019    HCT 47.6 03/25/2019    HCT 47.3 03/23/2019     (L) 03/25/2019     (L) 03/23/2019     09/28/2011    POTASSIUM 3.7 03/25/2019    POTASSIUM 3.8 03/23/2019    BUN 22 (H) 03/25/2019    BUN 25 (H) 03/23/2019    CREATININE 0.85 03/25/2019    CREATININE 0.74 03/23/2019    TSH 3.952 07/20/2017        36.9

## 2022-01-21 NOTE — ED PROVIDER NOTE - PATIENT PORTAL LINK FT
You can access the FollowMyHealth Patient Portal offered by [facility] by registering at the following website: [email or web address]. By joining [employer]’s FollowMyHealth portal, you will also be able to view your health information using other applications (apps) compatible with our system.

## 2022-01-21 NOTE — ED PROVIDER NOTE - NSFOLLOWUPINSTRUCTIONS_ED_ALL_ED_FT
CHRONIC WOUNDS - AfterCare(R) Instructions(ER/ED)           Chronic Wounds    WHAT YOU NEED TO KNOW:    A chronic wound is a wound that does not heal completely in 6 weeks. A wound is an injury that causes a break in the skin. There may also be damage to nearby tissues. Examples of wounds that can become chronic are deep ulcers (open sores), large burns, and infected cuts.    DISCHARGE INSTRUCTIONS:    Contact your healthcare provider if:   •You have a fever.       •You have increased or new pain, swelling, redness, or bleeding in or around your wound.      •You have pus or a foul odor coming from your wound.      •Your skin itches or has a rash.      •You have open sores, blisters, or changes in the color or temperature of your skin.       •You have questions or concerns about your condition or care.       Medicines:   •NSAIDs, such as ibuprofen, help decrease swelling, pain, and fever. This medicine is available with or without a doctor's order. NSAIDs can cause stomach bleeding or kidney problems in certain people. If you take blood thinner medicine, always ask if NSAIDs are safe for you. Always read the medicine label and follow directions. Do not give these medicines to children under 6 months of age without direction from your child's healthcare provider.      •Acetaminophen decreases pain and fever. It is available without a doctor's order. Ask how much to take and how often to take it. Follow directions. Read the labels of all other medicines you are using to see if they also contain acetaminophen, or ask your doctor or pharmacist. Acetaminophen can cause liver damage if not taken correctly. Do not use more than 4 grams (4,000 milligrams) total of acetaminophen in one day.       •Antibiotics may be given to prevent or treat an infection caused by bacteria.      •Take your medicine as directed. Contact your healthcare provider if you think your medicine is not helping or if you have side effects. Tell him or her if you are allergic to any medicine. Keep a list of the medicines, vitamins, and herbs you take. Include the amounts, and when and why you take them. Bring the list or the pill bottles to follow-up visits. Carry your medicine list with you in case of an emergency.      Follow up with your healthcare provider as directed: You need to return to have your wound checked. You may also need to have the bandage changed. Write down your questions so you remember to ask them during your visits.    What you need to know about wound care:   •Wash your hands before and after you take care of your wound.      • Keep the bandage clean and dry. Do not stop using the bandage on your wound unless your healthcare provider says it is okay.      •Clean the wound and change the dressing as often as directed by your healthcare provider.       Eat healthy foods and drink liquids as directed: Healthy foods give your body the nutrients it needs to heal your wound. Liquids prevent dehydration that can decrease the blood supply to your wound. Healthy foods include fruits, vegetables, grains (breads and cereals), dairy, and protein foods. Protein foods include meat, fish, nuts, and soy products. Protein, calories, vitamin C, and zinc help wounds heal. Ask for more information about the foods you should eat to improve healing.     Do not smoke: If you smoke, it is never too late to quit. Smoking delays wound healing. Smoking also increases your risk for infection after surgery. Ask your healthcare provider for information if you need help quitting.    Prevent pressure wounds: Pressure wounds can develop when blood flow to an area is blocked. For example, you sit or lie in the same position without moving and put pressure on your heels. You can prevent pressure wounds by doing any of the following:  •Change your position every 15 minutes while you are sitting.       •Change your position every 2 hours while you lie in bed.      •Prop your legs on pillows to lift your heels while you are lying down.      •Check your skin or have someone else check your skin daily. Check the areas that are common to pressure wounds, such as elbows, heels, and buttocks. Common early signs of pressure wounds are open sores, blisters, or changes in color or temperature.         © Copyright [email or web address] 2022           back to top                          © Copyright [email or web address] 2022

## 2022-02-09 ENCOUNTER — EMERGENCY (EMERGENCY)
Facility: HOSPITAL | Age: 52
LOS: 1 days | Discharge: HOME | End: 2022-02-09
Attending: EMERGENCY MEDICINE | Admitting: EMERGENCY MEDICINE
Payer: MEDICAID

## 2022-02-09 VITALS
DIASTOLIC BLOOD PRESSURE: 81 MMHG | SYSTOLIC BLOOD PRESSURE: 127 MMHG | HEART RATE: 84 BPM | WEIGHT: 160.06 LBS | HEIGHT: 70 IN | RESPIRATION RATE: 17 BRPM | TEMPERATURE: 98 F | OXYGEN SATURATION: 97 %

## 2022-02-09 DIAGNOSIS — R68.83 CHILLS (WITHOUT FEVER): ICD-10-CM

## 2022-02-09 DIAGNOSIS — I10 ESSENTIAL (PRIMARY) HYPERTENSION: ICD-10-CM

## 2022-02-09 DIAGNOSIS — I87.8 OTHER SPECIFIED DISORDERS OF VEINS: ICD-10-CM

## 2022-02-09 PROBLEM — B19.20 UNSPECIFIED VIRAL HEPATITIS C WITHOUT HEPATIC COMA: Chronic | Status: ACTIVE | Noted: 2022-01-24

## 2022-02-09 PROCEDURE — 99282 EMERGENCY DEPT VISIT SF MDM: CPT

## 2022-02-09 PROCEDURE — 99283 EMERGENCY DEPT VISIT LOW MDM: CPT

## 2022-02-09 NOTE — ED ADULT NURSE NOTE - CHIEF COMPLAINT QUOTE
BLE swelling. pt states "I have wound on my left leg." no sob or fever. DC from [facility]  this AM.

## 2022-02-09 NOTE — ED PROVIDER NOTE - PHYSICAL EXAMINATION
CONSTITUTIONAL: Well-appearing; well-nourished; in no apparent distress.   HEAD: Normocephalic; atraumatic.   EYES:  conjunctiva and sclera clear  ENT: normal nose; no rhinorrhea;  NECK: Supple; full ROM  RESPIRATORY: Breathing easily; no resp difficulty  EXT: No cyanosis, +edema of bilateral legs, w/ chronic venous stasis apperaing changes, slight 0.5cm superficial lesion to L leg without surrounding erythema or warmth or induration  SKIN: Normal for age and race; warm; dry; good turgor; no apparent lesions or rash.   NEURO: A & O x 3; face symmetric; grossly unremarkable.   PSYCHOLOGICAL: The patient’s mood and manner are appropriate.

## 2022-02-09 NOTE — ED PROVIDER NOTE - PATIENT PORTAL LINK FT
You can access the FollowMyHealth Patient Portal offered by [facility] by registering at the following website: [email or web address]. By joining [employer] [facility] [employer]’s FollowMyHealth portal, you will also be able to view your health information using other applications (apps) compatible with our system.

## 2022-02-09 NOTE — ED ADULT NURSE NOTE - NSIMPLEMENTINTERV_GEN_ALL_ED
Awake/Alert/Cooperative Implemented All Universal Safety Interventions:  [place] to call system. Call bell, personal items and telephone within reach. Instruct patient to call for assistance. Room bathroom lighting operational. Non-slip footwear when patient is off stretcher. Physically safe environment: no spills, clutter or unnecessary equipment. Stretcher in lowest position, wheels locked, appropriate side rails in place.

## 2022-02-09 NOTE — ED ADULT NURSE NOTE - OBJECTIVE STATEMENT
Patient to the ED with complaint of B/L leg swelling, L leg pain for the past week, reported that he injured L leg 3 months ago and it has not completely healed.

## 2022-02-09 NOTE — ED PROVIDER NOTE - NSFOLLOWUPINSTRUCTIONS_ED_ALL_ED_FT
Peripheral Edema - follow up with wound care clinic     Peripheral edema is swelling that is caused by a buildup of fluid. Peripheral edema most often affects the lower legs, ankles, and feet. It can also develop in the arms, hands, and face. The area of the body that has peripheral edema will look swollen. It may also feel heavy or warm. Your clothes may start to feel tight. Pressing on the area may make a temporary dent in your skin. You may not be able to move your arm or leg as much as usual.    There are many causes of peripheral edema. It can be a complication of other diseases, such as congestive heart failure, kidney disease, or a problem with your blood circulation. It also can be a side effect of certain medicines. It often happens to women during pregnancy. Sometimes, the cause is not known. Treating the underlying condition is often the only treatment for peripheral edema.    Follow these instructions at home:  Pay attention to any changes in your symptoms. Take these actions to help with your discomfort:    Raise (elevate) your legs while you are sitting or lying down.  Move around often to prevent stiffness and to lessen swelling. Do not sit or stand for long periods of time.  Wear support stockings as told by your health care provider.  Follow instructions from your health care provider about limiting salt (sodium) in your diet. Sometimes eating less salt can reduce swelling.  Take over-the-counter and prescription medicines only as told by your health care provider. Your health care provider may prescribe medicine to help your body get rid of excess water (diuretic).  Keep all follow-up visits as told by your health care provider. This is important.    Contact a health care provider if:  You have a fever.  Your edema starts suddenly or is getting worse, especially if you are pregnant or have a medical condition.  You have swelling in only one leg.  You have increased swelling and pain in your legs.  Get help right away if:  You develop shortness of breath, especially when you are lying down.  You have pain in your chest or abdomen.  You feel weak.  You faint.

## 2022-02-09 NOTE — ED ADULT TRIAGE NOTE - CHIEF COMPLAINT QUOTE
BLE swelling. pt states "I have wound on my left leg." no sob or fever. DC from [facility]  this AM.

## 2022-02-09 NOTE — ED PROVIDER NOTE - OBJECTIVE STATEMENT
52 y/o M with a PMHX of Hep C and anemia presents to the ED with a cc of feeling cold and concern about left lower leg wound. has been there for months, and chronic edema. has follow up with a wound clinic, but came to the ED anyway today for no real reason

## 2022-04-15 NOTE — ED PROVIDER NOTE - CLINICAL SUMMARY MEDICAL DECISION MAKING FREE TEXT BOX
Pt aware  50 M undomiciled pmh depression, bipolar d/o off meds, covid + in April for approx 2 years p/w depression and feeling overwhelmed. no SI/HI, delusions.  admits to occasional etoh, denies drugs.  will obtain psych clearance labs and consult psych.  placed on one to one as pt became agitated but verbally deesclated

## 2022-05-24 NOTE — ED PROVIDER NOTE - ATTESTATION, MLM
Yes - the patient is able to be screened I have reviewed and confirmed nurses' notes for patient's medications, allergies, medical history, and surgical history.

## 2022-07-14 ENCOUNTER — EMERGENCY (EMERGENCY)
Facility: HOSPITAL | Age: 52
LOS: 1 days | Discharge: HOME | End: 2022-07-14
Admitting: EMERGENCY MEDICINE

## 2022-07-14 VITALS
HEART RATE: 78 BPM | DIASTOLIC BLOOD PRESSURE: 82 MMHG | OXYGEN SATURATION: 98 % | RESPIRATION RATE: 18 BRPM | TEMPERATURE: 98 F | HEIGHT: 70 IN | SYSTOLIC BLOOD PRESSURE: 134 MMHG

## 2022-07-14 DIAGNOSIS — F11.10 OPIOID ABUSE, UNCOMPLICATED: ICD-10-CM

## 2022-07-14 DIAGNOSIS — I10 ESSENTIAL (PRIMARY) HYPERTENSION: ICD-10-CM

## 2022-07-14 DIAGNOSIS — Z86.74 PERSONAL HISTORY OF SUDDEN CARDIAC ARREST: ICD-10-CM

## 2022-07-14 DIAGNOSIS — F10.229 ALCOHOL DEPENDENCE WITH INTOXICATION, UNSPECIFIED: ICD-10-CM

## 2022-07-14 DIAGNOSIS — F17.200 NICOTINE DEPENDENCE, UNSPECIFIED, UNCOMPLICATED: ICD-10-CM

## 2022-07-14 DIAGNOSIS — B19.20 UNSPECIFIED VIRAL HEPATITIS C WITHOUT HEPATIC COMA: ICD-10-CM

## 2022-07-14 PROCEDURE — 99283 EMERGENCY DEPT VISIT LOW MDM: CPT

## 2022-07-14 NOTE — ED PROVIDER NOTE - PATIENT PORTAL LINK FT
You can access the FollowMyHealth Patient Portal offered by [facility] by registering at the following website: [email or web address]. By joining [employer]’s FollowMyHealth portal, you will also be able to view your health information using other applications (apps) compatible with our system.

## 2022-07-14 NOTE — ED PROVIDER NOTE - OBJECTIVE STATEMENT
52-year-old male with PMHx of HTN, polysubstance dependence, chronic alcoholism, BIBA for AMS and public intoxication. Admits to having heavy EtOH intake today.  Denies drug use or other illicits. No acute medical complaints or apparent trauma noted. Denies trauma, fall, HA, dizziness, bleeding, N/V/D/C, CP, SOB, palpitations, tremors, change in urinary/bowel function, and abdominal pain.

## 2022-07-14 NOTE — ED PROVIDER NOTE - NSFOLLOWUPINSTRUCTIONS_ED_ALL_ED_FT
Alcohol Intoxication    Alcohol intoxication occurs when the amount of alcohol that a person has consumed impairs his or her ability to mentally and physically function. Chronic alcohol consumption can also lead to a variety of health issues including neurological disease, stomach disease, heart disease, liver disease, etc. Do not drive after drinking alcohol. Drinking enough alcohol to end up in an Emergency Room suggests you may have an alcohol abuse problem. Seek help at a drug addiction center.    SEEK IMMEDIATE MEDICAL CARE IF YOU HAVE ANY OF THE FOLLOWING SYMPTOMS: seizures, vomiting blood, blood in your stool, lightheadedness/dizziness, or becoming shaky to tremulous when you stop drinking.

## 2022-07-14 NOTE — ED ADULT TRIAGE NOTE - CHIEF COMPLAINT QUOTE
BIBA from street reporting AMS after drinking alcohol. pt is ambulatory with a steady gait, no injury/trauma

## 2022-07-14 NOTE — ED PROVIDER NOTE - PHYSICAL EXAMINATION
Gen - Unkempt M, +AOB, no acute distress  Skin - warm, dry, intact  HEENT - AT/NC, PERRL, mild conjunctival injection, pupils 3mm b/l, TM intact with no hemotympanium b/l, no facial contusion or periorbital ecchymosis, o/p clear, uvula midline, airway patent, neck supple with no step off or midline tenderness, FROM   CV - S1S2, R/R/R  Resp - respiration non-labored, CTAB, symmetric bs b/l, no r/r/w  GI - NABS, soft, ND, NT, no rebound or guarding, no CVAT b/l  MS - w/w/p, no c/c/e, calves supple and NT  Neuro - Alert and awake, slightly slurred speech, ambulatory with steady gait, no focal deficits

## 2022-07-14 NOTE — ED ADULT NURSE NOTE - NSICDXPASTSURGICALHX_GEN_ALL_CORE_FT
previously intubated - no problems
PAST SURGICAL HISTORY:  No significant past surgical history

## 2022-07-14 NOTE — ED ADULT NURSE NOTE - OBJECTIVE STATEMENT
Pt BIBA for AMS, admits to ETOH abuse. Pt is responsive to verbal stimuli, speaking in full sentences. Airway is patent and breathing is spontaneous and unlabored. No visible injures or traumas noted. Pt placed in chair in view of nurses station, ambulating with steady gait. Will monitor pt closely

## 2022-07-21 ENCOUNTER — EMERGENCY (EMERGENCY)
Facility: HOSPITAL | Age: 52
LOS: 1 days | Discharge: HOME | End: 2022-07-21
Attending: EMERGENCY MEDICINE | Admitting: EMERGENCY MEDICINE
Payer: MEDICAID

## 2022-07-21 VITALS
HEART RATE: 64 BPM | RESPIRATION RATE: 18 BRPM | DIASTOLIC BLOOD PRESSURE: 87 MMHG | SYSTOLIC BLOOD PRESSURE: 142 MMHG | OXYGEN SATURATION: 98 % | TEMPERATURE: 98 F

## 2022-07-21 VITALS
HEART RATE: 73 BPM | TEMPERATURE: 98 F | HEIGHT: 70 IN | SYSTOLIC BLOOD PRESSURE: 139 MMHG | WEIGHT: 154.1 LBS | OXYGEN SATURATION: 97 % | RESPIRATION RATE: 18 BRPM | DIASTOLIC BLOOD PRESSURE: 79 MMHG

## 2022-07-21 DIAGNOSIS — F19.20 OTHER PSYCHOACTIVE SUBSTANCE DEPENDENCE, UNCOMPLICATED: ICD-10-CM

## 2022-07-21 DIAGNOSIS — F39 UNSPECIFIED MOOD [AFFECTIVE] DISORDER: ICD-10-CM

## 2022-07-21 DIAGNOSIS — F41.1 GENERALIZED ANXIETY DISORDER: ICD-10-CM

## 2022-07-21 PROCEDURE — 99284 EMERGENCY DEPT VISIT MOD MDM: CPT

## 2022-07-21 PROCEDURE — 82962 GLUCOSE BLOOD TEST: CPT

## 2022-07-21 PROCEDURE — 90792 PSYCH DIAG EVAL W/MED SRVCS: CPT

## 2022-07-21 PROCEDURE — 99283 EMERGENCY DEPT VISIT LOW MDM: CPT

## 2022-07-21 RX ORDER — GABAPENTIN 400 MG/1
100 CAPSULE ORAL ONCE
Refills: 0 | Status: COMPLETED | OUTPATIENT
Start: 2022-07-21 | End: 2022-07-21

## 2022-07-21 RX ADMIN — GABAPENTIN 100 MILLIGRAM(S): 400 CAPSULE ORAL at 13:36

## 2022-07-21 NOTE — ED PROVIDER NOTE - CLINICAL SUMMARY MEDICAL DECISION MAKING FREE TEXT BOX
52 M pmh hcv, PSA, etoh abuse, PTSD, bipolar d/o presents requesting to speak to psychiatrist, reports feeling very anxious. denies SI/HI, delusions.  took xanax at 3am.  denies any recent illicit drugs/etoh.  pt vss, no e/o etoh w/d or intoxication, lungs ctab, hrrr, a/ox3, no focal neuro deficits.  will d/w psych although likely only outpt referrals will be provided.

## 2022-07-21 NOTE — ED BEHAVIORAL HEALTH ASSESSMENT NOTE - SAFETY PLAN ADDT'L DETAILS
Education provided regarding environmental safety / lethal means restriction/Provision of National Suicide Prevention Lifeline 3-553-803-TALK (5494)

## 2022-07-21 NOTE — ED ADULT NURSE NOTE - ORIENTED TO PLACE
Subjective:       Patient ID: Chloe Wallace is a 20 y.o. female.    Chief Complaint: Exposure to STD (New partner)    Exposure to STD  No symptoms  New partner- unprotected sex  Hx of HSV 1    Exposure to STD   The patient's pertinent negatives include no dysuria or pelvic pain. Pertinent negatives include no abdominal pain, fever or urinary frequency.     Review of Systems   Constitutional: Negative for chills, fatigue and fever.   Gastrointestinal: Negative for abdominal pain.   Genitourinary: Negative for dysuria, flank pain, frequency, genital sores, pelvic pain, urgency and vaginal discharge.         Objective:      Physical Exam  Constitutional:       General: She is not in acute distress.     Appearance: Normal appearance. She is not ill-appearing, toxic-appearing or diaphoretic.   Eyes:      Pupils: Pupils are equal, round, and reactive to light.   Cardiovascular:      Rate and Rhythm: Normal rate and regular rhythm.      Pulses: Normal pulses.      Heart sounds: Normal heart sounds.   Pulmonary:      Effort: Pulmonary effort is normal.      Breath sounds: Normal breath sounds.   Abdominal:      General: Bowel sounds are normal.      Palpations: Abdomen is soft.      Tenderness: There is no abdominal tenderness. There is no right CVA tenderness, left CVA tenderness, guarding or rebound.   Skin:     General: Skin is warm and dry.   Neurological:      Mental Status: She is alert and oriented to person, place, and time.   Psychiatric:         Mood and Affect: Mood normal.         Behavior: Behavior normal.            Assessment:       1. Screening examination for venereal disease        Plan:   Screening examination for venereal disease  -     Chlamydia/GC, PCR; Future; Expected date: 06/04/2022  -     POCT urine pregnancy  -     Bacterial Vaginosis; Future; Expected date: 06/04/2022  -     POCT URINALYSIS W/O SCOPE  -     HIV 1/2 Ag/Ab (4th Gen); Future; Expected date: 06/04/2022  -     Herpes simplex type  1&2 IgG,Herpes titer; Future; Expected date: 06/04/2022  -     Herpes simplex type 1 & 2 IgM,Herpes IgM; Future; Expected date: 06/04/2022  -     Syphilis Antibody with reflex to RPR; Future; Expected date: 06/04/2022              Yes

## 2022-07-21 NOTE — ED PROVIDER NOTE - NSFOLLOWUPINSTRUCTIONS_ED_ALL_ED_FT
Please go to [facility] psychiatric clinic    [facility]  Address: [address] [place] [address]  Appointment Center: [phone] ([phone])     [facility] is a good referral line for crisis and substance abuse help.  [facility] has drop in programs all over the [facility].    Return to the ER for Emergencies.  Return immediately for any new or worsening symptoms or any new concerns          Substance Abuse    Chemical dependency is an addiction to drugs or alcohol. It is characterized by the repeated behavior of seeking out and using drugs and alcohol despite harmful consequences to the health and safety of oneself and others. Using drugs in a manner that brought you to an Emergency Room suggests you may have an drug abuse problem. Seek help at a drug addiction center.    SEEK IMMEDIATE MEDICAL CARE IF YOU HAVE ANY OF THE FOLLOWING SYMPTOMS: chest pain, shortness of breath, change in mental status, thoughts about hurting killing yourself, thoughts about hurting or killing somebody else, hallucinations, or worsening depression.

## 2022-07-21 NOTE — ED PROVIDER NOTE - NS ED ATTENDING STATEMENT MOD
This was a shared visit with the SHARLENE. I reviewed and verified the documentation and independently performed the documented:

## 2022-07-21 NOTE — ED PROVIDER NOTE - PROGRESS NOTE DETAILS
eval by psych, pt agreeable to go to [facility] walk in clinic.  also given outpt referrals.  recommend 100mg gabapentin and dc to f/u at [facility].  discussed strict return parameters

## 2022-07-21 NOTE — ED BEHAVIORAL HEALTH ASSESSMENT NOTE - DESCRIPTION
undomiciled vs living with friends; ; has 2 children (12yo and adult), unemployed see bh note    Vital Signs Last 24 Hrs  T(C): 36.9 (14 Dec 2020 18:22), Max: 36.9 (14 Dec 2020 18:22)  T(F): 98.4 (14 Dec 2020 18:22), Max: 98.4 (14 Dec 2020 18:22)  HR: 82 (14 Dec 2020 18:22) (82 - 82)  BP: 148/96 (14 Dec 2020 18:22) (148/96 - 148/96)  BP(mean): --  RR: 18 (14 Dec 2020 18:22) (18 - 18)  SpO2: 98% (14 Dec 2020 18:22) (98% - 98%) hepatitis C

## 2022-07-21 NOTE — ED BEHAVIORAL HEALTH ASSESSMENT NOTE - SUMMARY
Patient is a 51yo  male, undomiciled, father of 12yo child who lives with mother, unemployed, with reported PPH of PTSD, bipolar disorder, polysubstance abuse (opioid on methadone 120 mg daily, benzodiazepines, cocaine, amphetamines, ETOH), one reported past inpatient admission, no current outpatient tx, no reported past SAs or self harm, past detox/rehab stays, no hx of violence, hx/o arrests for drug related charges, with PMH of hepatitis C who presented to the ED requesting to speak to psychiatrist due to feeling anxious in context of ongoing ETOH/benzo abuse, homelessness, and non compliance with treatment. Pt denies acute mood or psychotic symptoms. There is no evidence of SI/HI/AVH/PI or acute withdrawal. Pt will greatly benefit from inpatient substance abuse treatment which he refuses at this time. He is willing to accept referral to outpatient substance abuse programs at [facility]. Pt does not warrant inpatient admission. Cleared for discharge

## 2022-07-21 NOTE — ED BEHAVIORAL HEALTH ASSESSMENT NOTE - HOMICIDALITY / AGGRESSION (CURRENT/PAST)
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

## 2022-07-21 NOTE — ED ADULT NURSE NOTE - NSIMPLEMENTINTERV_GEN_ALL_ED
Implemented All Universal Safety Interventions:  [place] to call system. Call bell, personal items and telephone within reach. Instruct patient to call for assistance. Room bathroom lighting operational. Non-slip footwear when patient is off stretcher. Physically safe environment: no spills, clutter or unnecessary equipment. Stretcher in lowest position, wheels locked, appropriate side rails in place.

## 2022-07-21 NOTE — ED BEHAVIORAL HEALTH ASSESSMENT NOTE - OTHER PAST PSYCHIATRIC HISTORY (INCLUDE DETAILS REGARDING ONSET, COURSE OF ILLNESS, INPATIENT/OUTPATIENT TREATMENT)
as per prior notes one past psychiatric hospitalization  has not been in outpatient treatment recently

## 2022-07-21 NOTE — ED PROVIDER NOTE - PHYSICAL EXAMINATION
Vitals reviewed  Gen: unkempt appearing but nad, speaking in full sentences, no tremor or diaphoresis   Skin: wwp, no rash/lesions  HEENT: ncat, eomi, mmm  CV: rrr, no audible m/r/g  Resp: symmetrical expansion, ctab, no w/r/r  Ext: FROM throughout, no peripheral edema  Neuro: alert/oriented x3, flat affect, no focal deficits, steady gait

## 2022-07-21 NOTE — ED BEHAVIORAL HEALTH ASSESSMENT NOTE - REFERRAL / APPOINTMENT DETAILS
Patient was referred to [facility] [place] Walk in clinic for ongoing substance abuse treatment

## 2022-07-21 NOTE — ED PROVIDER NOTE - ATTENDING APP SHARED VISIT CONTRIBUTION OF CARE
51 yo male h/o hcv, PSA, etoh abuse, PTSD, bipolar d/o c/o anxiety and wanting to speak to psych.  + etoh, substance abuse.  No relief w xanax he took earlier.  Last etoh 2 d ago w/o sig w/d sx.  No si/hi.  Plan psych consult; likely dc to [facility] as outpt.

## 2022-07-21 NOTE — ED ADULT TRIAGE NOTE - NS_BH TRG QUESTION7_ED_ALL_ED
Depression (without Suicidality or Psychosis)/Anxiety (includes Panic, OCD) Depression (without Suicidality or Psychosis)/Debra (includes Bipolar Disorder)/Anxiety (includes Panic, OCD)

## 2022-07-21 NOTE — ED ADULT TRIAGE NOTE - CHIEF COMPLAINT QUOTE
pt reports " I feel very anxious, like someone is going to push me off the train platform." Pt reports feeling "severe social anxiety." Requests talking to a psychiatrist. Hx depression, anxiety and PTSD. pt denies SI or HI. pt reports " I feel very anxious, like someone is going to push me off the train platform." Pt reports feeling "severe social anxiety." Requests talking to a psychiatrist. Hx depression, bipolar, anxiety and PTSD. pt denies SI or HI.

## 2022-07-21 NOTE — ED PROVIDER NOTE - OBJECTIVE STATEMENT
52 M pmh hcv, PSA, etoh abuse, PTSD, bipolar d/o presents requesting to speak to psychiatrist, reports feeling very anxious.  pt states he doesnt seek help often but feels very anxious lately and wants help of psychiatrist.  has not seen one in approx 1 yr after incident at clinic.  admits to daily etoh use and "any drugs i can get my hands on," but states does not smoke any drugs.  took xanax around 3am due to anxiety but denies any other drugs today and last etoh 2 nights ago.  denies f/c, headache, dizziness, fainting, chest pain, sob, abd pain, nvd, fall/injuries, SI/HI, delusions,

## 2022-07-21 NOTE — ED BEHAVIORAL HEALTH ASSESSMENT NOTE - HPI (INCLUDE ILLNESS QUALITY, SEVERITY, DURATION, TIMING, CONTEXT, MODIFYING FACTORS, ASSOCIATED SIGNS AND SYMPTOMS)
Patient is a 53yo  male, undomiciled, father of 12yo child who lives with mother, unemployed, with reported PPH of PTSD, bipolar disorder, polysubstance abuse (opioid on methadone 120 mg daily, benzodiazepines, cocaine, amphetamines, ETOH), one reported past inpatient admission, no current outpatient tx, currently in [facility], no reported past SAs or self harm, past detox/rehab stays, no hx of known violence, past arrests for drug related charges, with PMH of hepatitis C who presented to the ED requesting to speak to psychiatrist due to feeling anxious.    On exam pt is noted to be calm and cooperative, stating that he was in psychiatric care in the past but has not seen a psychiatrist for 4years due to be an incident which pt claims was a misunderstanding. Pt states that he was prescribed Gabapentin and Klonopin by the psychiatrist in the past and he would like to be re-started on these medications. When pt was told that given hx/o PSA and ongoing ETOH use, Klonopin would not be appropriate, he became irritable, stating that this is the only medication that helps him with anxiety. When questioned about barriers to getting care in the past, pt stated he feels "too anxious" to make it to appointments. Of note pt attends daily Methadone program. He was unable to produce the Methadone card but stated that he takes Methadone [facility] mg [facility] at [facility] on "[address] Pt was somewhat a vague historian, stated that his last drink was yesterday (not consistent with information provided earlier to ED PA), reports drinking one pint of vodka daily. States he bought Xanax 3 mg on the street this am. Denied acute withdrawal symptoms. No tremors/diaphoreses noted on PE. Pt reports hx/o withdrawal seizure. Denies hx/o DT's.  Pt denied acute exacerbation of mood symptoms. He denied SI/HI/AVH/PI. Requested Gabapentin and Klonopin. Accepted referrals to walk in clinic at [facility]. Refused inpatient rehab referrals.   No collateral available

## 2022-07-21 NOTE — ED ADULT NURSE NOTE - NS_BH TRG QUESTION7_ED_ALL_ED
Depression (without Suicidality or Psychosis)/Debra (includes Bipolar Disorder)/Anxiety (includes Panic, OCD)

## 2022-07-21 NOTE — ED PROVIDER NOTE - PATIENT PORTAL LINK FT
You can access the FollowMyHealth Patient Portal offered by [facility] by registering at the following website: [email or web address]. By joining [employer]’s FollowMyHealth portal, you will also be able to view your health information using other applications (apps) compatible with our system.

## 2022-07-21 NOTE — ED ADULT NURSE NOTE - OBJECTIVE STATEMENT
53 y/o male a&ox3, speaking in full sentences, no acute distress. Pt reports to ED c/o anxiety. Pt states he keeps having thoughts that someone is going to push him off the train platform. Pt states he suffers from social anxiety, has not taken medication for "a while". Denies SI/HI, auditory and visual hallucinations, n/v, dizziness. Pt states he self medicates with alcohol, last drink yesterday. No tremors noted. f/s 150

## 2022-07-21 NOTE — ED ADULT NURSE NOTE - CHIEF COMPLAINT QUOTE
pt reports " I feel very anxious, like someone is going to push me off the train platform." Pt reports feeling "severe social anxiety." Requests talking to a psychiatrist. Hx depression, bipolar, anxiety and PTSD. pt denies SI or HI.

## 2022-07-21 NOTE — ED BEHAVIORAL HEALTH ASSESSMENT NOTE - RISK ASSESSMENT
Low Acute Suicide Risk Pt is at low acute risk for self harm. He however is at elevated risk for self harm RF - substance abuse, past hospitalizations, unclear living situation, unemployed,  legal issues, past arrests  PF - no current SI/HI, no past SAs or self harm, future oriented, help seeking, outpatient follow up, no current manic or psychotic sx, responsibility to family/others    COVID Exposure Screen- Patient     1.        *Have you had a COVID-19 test in the last 21 days?  ( X ) Yes   ( ) No   (  ) Unknown- Reason: ______  IF YES PROCEED TO QUESTION #2. IF NO OR UNKNOWN THEN PLEASE SKIP TO QUESTION #3.  2.        Date of test: _last week___  3.        3. Do you know the result? (X) Negative   (  ) Positive   (  ) No result available  4.        *In the past 14 days, have you been around anyone with a positive COVID-19 test?*  (  ) Yes   ( X) No   (  ) Unknown- Reason (e.g. patient uncertain, sedated, refusing to answer, etc.):  ______  IF YES PROCEED TO QUESTION #5. IF NO or UNKNOWN, PLEASE SKIP TO QUESTION #10  5.        Were you within 6 feet of them for at least 15 minutes? (  ) Yes   (  ) No   (  ) Unknown- Reason: _____  6.        Have you provided care for them? (  ) Yes   (  ) No   (  ) Unknown- Reason: ______  7.        Have you had direct physical contact with them (touched, hugged, or kissed them)? (  ) Yes   (  ) No    (  ) Unknown- Reason: ___  8.        Have you shared eating or drinking utensils with them? (  ) Yes   (  ) No    (  ) Unknown- Reason: ____  9.        Have they sneezed, coughed, or somehow got respiratory droplets on you? (  ) Yes   (  ) No    (  ) Unknown- Reason: ______  10.     *Have you been out of [place] within the past 14 days?*  (  ) Yes   ( X) No   (  ) Unknown- Reason (e.g. patient uncertain, sedated, refusing to answer, etc.): _______  IF YES PLEASE ANSWER THE FOLLOWING QUESTIONS:  11.     Which state/country have you been to? ______  12.     Were you there over 24 hours? (  ) Yes   (  ) No    (  ) Unknown- Reason: ______  13.     Date of return to [facility]: ______

## 2022-07-24 DIAGNOSIS — F19.20 OTHER PSYCHOACTIVE SUBSTANCE DEPENDENCE, UNCOMPLICATED: ICD-10-CM

## 2022-07-24 DIAGNOSIS — Z86.74 PERSONAL HISTORY OF SUDDEN CARDIAC ARREST: ICD-10-CM

## 2022-07-24 DIAGNOSIS — F41.1 GENERALIZED ANXIETY DISORDER: ICD-10-CM

## 2022-07-24 DIAGNOSIS — F31.9 BIPOLAR DISORDER, UNSPECIFIED: ICD-10-CM

## 2022-07-24 DIAGNOSIS — F43.10 POST-TRAUMATIC STRESS DISORDER, UNSPECIFIED: ICD-10-CM

## 2022-07-24 DIAGNOSIS — F10.10 ALCOHOL ABUSE, UNCOMPLICATED: ICD-10-CM

## 2022-07-24 DIAGNOSIS — Z59.00 HOMELESSNESS UNSPECIFIED: ICD-10-CM

## 2022-07-24 DIAGNOSIS — R97.20 ELEVATED PROSTATE SPECIFIC ANTIGEN [PSA]: ICD-10-CM

## 2022-07-24 DIAGNOSIS — F41.9 ANXIETY DISORDER, UNSPECIFIED: ICD-10-CM

## 2022-07-24 DIAGNOSIS — I10 ESSENTIAL (PRIMARY) HYPERTENSION: ICD-10-CM

## 2022-07-24 DIAGNOSIS — B19.20 UNSPECIFIED VIRAL HEPATITIS C WITHOUT HEPATIC COMA: ICD-10-CM

## 2022-07-24 SDOH — ECONOMIC STABILITY - HOUSING INSECURITY: HOMELESSNESS UNSPECIFIED: Z59.00

## 2022-08-16 ENCOUNTER — HOSPITAL ENCOUNTER (INPATIENT)
Dept: HOSPITAL 74 - YASAS | Age: 52
LOS: 6 days | Discharge: HOME | DRG: 773 | End: 2022-08-22
Attending: SURGERY | Admitting: ALLERGY & IMMUNOLOGY
Payer: COMMERCIAL

## 2022-08-16 VITALS — BODY MASS INDEX: 21.8 KG/M2

## 2022-08-16 DIAGNOSIS — G62.9: ICD-10-CM

## 2022-08-16 DIAGNOSIS — Z86.19: ICD-10-CM

## 2022-08-16 DIAGNOSIS — Z86.69: ICD-10-CM

## 2022-08-16 DIAGNOSIS — I83.11: ICD-10-CM

## 2022-08-16 DIAGNOSIS — F11.20: ICD-10-CM

## 2022-08-16 DIAGNOSIS — F17.210: ICD-10-CM

## 2022-08-16 DIAGNOSIS — F10.230: Primary | ICD-10-CM

## 2022-08-16 DIAGNOSIS — F19.282: ICD-10-CM

## 2022-08-16 DIAGNOSIS — F19.280: ICD-10-CM

## 2022-08-16 DIAGNOSIS — I83.12: ICD-10-CM

## 2022-08-16 DIAGNOSIS — F13.230: ICD-10-CM

## 2022-08-16 DIAGNOSIS — B18.2: ICD-10-CM

## 2022-08-16 PROCEDURE — HZ2ZZZZ DETOXIFICATION SERVICES FOR SUBSTANCE ABUSE TREATMENT: ICD-10-PCS | Performed by: SURGERY

## 2022-08-16 PROCEDURE — U0005 INFEC AGEN DETEC AMPLI PROBE: HCPCS

## 2022-08-16 PROCEDURE — U0003 INFECTIOUS AGENT DETECTION BY NUCLEIC ACID (DNA OR RNA); SEVERE ACUTE RESPIRATORY SYNDROME CORONAVIRUS 2 (SARS-COV-2) (CORONAVIRUS DISEASE [COVID-19]), AMPLIFIED PROBE TECHNIQUE, MAKING USE OF HIGH THROUGHPUT TECHNOLOGIES AS DESCRIBED BY CMS-2020-01-R: HCPCS

## 2022-08-16 RX ADMIN — GABAPENTIN SCH MG: 100 CAPSULE ORAL at 22:41

## 2022-08-16 RX ADMIN — Medication SCH TAB: at 15:56

## 2022-08-16 RX ADMIN — METHOCARBAMOL PRN MG: 500 TABLET ORAL at 15:54

## 2022-08-16 RX ADMIN — HYDROXYZINE PAMOATE SCH MG: 25 CAPSULE ORAL at 18:05

## 2022-08-16 RX ADMIN — Medication SCH MG: at 22:41

## 2022-08-16 RX ADMIN — HYDROXYZINE PAMOATE SCH MG: 25 CAPSULE ORAL at 22:41

## 2022-08-17 LAB
ALBUMIN SERPL-MCNC: 2.8 G/DL (ref 3.4–5)
ALP SERPL-CCNC: 91 U/L (ref 45–117)
ALT SERPL-CCNC: 34 U/L (ref 13–61)
ANION GAP SERPL CALC-SCNC: 6 MMOL/L (ref 8–16)
AST SERPL-CCNC: 26 U/L (ref 15–37)
BILIRUB SERPL-MCNC: 0.3 MG/DL (ref 0.2–1)
BUN SERPL-MCNC: 16.6 MG/DL (ref 7–18)
CALCIUM SERPL-MCNC: 8.7 MG/DL (ref 8.5–10.1)
CHLORIDE SERPL-SCNC: 104 MMOL/L (ref 98–107)
CO2 SERPL-SCNC: 32 MMOL/L (ref 21–32)
CREAT SERPL-MCNC: 0.7 MG/DL (ref 0.55–1.3)
DEPRECATED RDW RBC AUTO: 14.6 % (ref 11.9–15.9)
GLUCOSE SERPL-MCNC: 96 MG/DL (ref 74–106)
HCT VFR BLD CALC: 35.4 % (ref 35.4–49)
HGB BLD-MCNC: 12 GM/DL (ref 11.7–16.9)
HIV 1+2 AB+HIV1 P24 AG SERPL QL IA: NEGATIVE
MCH RBC QN AUTO: 29.5 PG (ref 25.7–33.7)
MCHC RBC AUTO-ENTMCNC: 33.9 G/DL (ref 32–35.9)
MCV RBC: 87 FL (ref 80–96)
PLATELET # BLD AUTO: 174 10^3/UL (ref 134–434)
PMV BLD: 8.4 FL (ref 7.5–11.1)
PROT SERPL-MCNC: 6 G/DL (ref 6.4–8.2)
RBC # BLD AUTO: 4.06 M/MM3 (ref 4–5.6)
SODIUM SERPL-SCNC: 142 MMOL/L (ref 136–145)
WBC # BLD AUTO: 6.2 K/MM3 (ref 4–10)

## 2022-08-17 RX ADMIN — NICOTINE POLACRILEX PRN MG: 2 GUM, CHEWING ORAL at 18:11

## 2022-08-17 RX ADMIN — Medication SCH MG: at 22:33

## 2022-08-17 RX ADMIN — HYDROXYZINE PAMOATE SCH MG: 25 CAPSULE ORAL at 07:12

## 2022-08-17 RX ADMIN — GABAPENTIN SCH MG: 100 CAPSULE ORAL at 07:12

## 2022-08-17 RX ADMIN — NICOTINE POLACRILEX PRN MG: 2 GUM, CHEWING ORAL at 22:36

## 2022-08-17 RX ADMIN — GABAPENTIN SCH MG: 100 CAPSULE ORAL at 14:28

## 2022-08-17 RX ADMIN — GABAPENTIN SCH MG: 100 CAPSULE ORAL at 22:33

## 2022-08-17 RX ADMIN — HYDROXYZINE PAMOATE SCH MG: 25 CAPSULE ORAL at 22:34

## 2022-08-17 RX ADMIN — NICOTINE POLACRILEX PRN MG: 2 GUM, CHEWING ORAL at 10:47

## 2022-08-17 RX ADMIN — Medication SCH MG: at 22:34

## 2022-08-17 RX ADMIN — HYDROXYZINE PAMOATE SCH MG: 25 CAPSULE ORAL at 14:28

## 2022-08-17 RX ADMIN — HYDROXYZINE PAMOATE SCH MG: 25 CAPSULE ORAL at 18:09

## 2022-08-17 RX ADMIN — NICOTINE POLACRILEX PRN MG: 2 GUM, CHEWING ORAL at 14:50

## 2022-08-17 RX ADMIN — Medication SCH TAB: at 10:46

## 2022-08-17 RX ADMIN — HYDROXYZINE PAMOATE SCH MG: 25 CAPSULE ORAL at 10:43

## 2022-08-18 RX ADMIN — NICOTINE POLACRILEX PRN MG: 2 GUM, CHEWING ORAL at 05:57

## 2022-08-18 RX ADMIN — Medication SCH MG: at 22:40

## 2022-08-18 RX ADMIN — Medication SCH TAB: at 10:31

## 2022-08-18 RX ADMIN — Medication SCH MG: at 22:41

## 2022-08-18 RX ADMIN — GABAPENTIN SCH MG: 100 CAPSULE ORAL at 22:41

## 2022-08-18 RX ADMIN — NICOTINE POLACRILEX PRN MG: 2 GUM, CHEWING ORAL at 14:34

## 2022-08-18 RX ADMIN — METHOCARBAMOL PRN MG: 500 TABLET ORAL at 18:44

## 2022-08-18 RX ADMIN — HYDROXYZINE PAMOATE SCH MG: 25 CAPSULE ORAL at 14:33

## 2022-08-18 RX ADMIN — GABAPENTIN SCH MG: 100 CAPSULE ORAL at 05:53

## 2022-08-18 RX ADMIN — HYDROXYZINE PAMOATE SCH MG: 25 CAPSULE ORAL at 22:41

## 2022-08-18 RX ADMIN — HYDROXYZINE PAMOATE SCH MG: 25 CAPSULE ORAL at 18:43

## 2022-08-18 RX ADMIN — HYDROXYZINE PAMOATE SCH MG: 25 CAPSULE ORAL at 10:31

## 2022-08-18 RX ADMIN — HYDROXYZINE PAMOATE SCH MG: 25 CAPSULE ORAL at 05:53

## 2022-08-18 RX ADMIN — NICOTINE POLACRILEX PRN MG: 2 GUM, CHEWING ORAL at 10:35

## 2022-08-18 RX ADMIN — GABAPENTIN SCH MG: 100 CAPSULE ORAL at 14:33

## 2022-08-19 RX ADMIN — HYDROXYZINE PAMOATE SCH: 25 CAPSULE ORAL at 14:11

## 2022-08-19 RX ADMIN — HYDROXYZINE PAMOATE SCH MG: 25 CAPSULE ORAL at 18:08

## 2022-08-19 RX ADMIN — HYDROXYZINE PAMOATE SCH MG: 25 CAPSULE ORAL at 06:29

## 2022-08-19 RX ADMIN — HYDROXYZINE PAMOATE SCH MG: 25 CAPSULE ORAL at 10:18

## 2022-08-19 RX ADMIN — GABAPENTIN SCH MG: 100 CAPSULE ORAL at 06:25

## 2022-08-19 RX ADMIN — Medication SCH TAB: at 10:18

## 2022-08-19 RX ADMIN — Medication SCH MG: at 22:04

## 2022-08-19 RX ADMIN — HYDROXYZINE PAMOATE SCH MG: 25 CAPSULE ORAL at 22:05

## 2022-08-19 RX ADMIN — GABAPENTIN SCH MG: 100 CAPSULE ORAL at 22:05

## 2022-08-19 RX ADMIN — Medication SCH MG: at 22:05

## 2022-08-19 RX ADMIN — NICOTINE POLACRILEX PRN MG: 2 GUM, CHEWING ORAL at 10:21

## 2022-08-19 RX ADMIN — METHOCARBAMOL PRN MG: 500 TABLET ORAL at 18:09

## 2022-08-19 RX ADMIN — GABAPENTIN SCH: 100 CAPSULE ORAL at 14:11

## 2022-08-20 RX ADMIN — Medication SCH MG: at 22:54

## 2022-08-20 RX ADMIN — GABAPENTIN SCH MG: 100 CAPSULE ORAL at 22:54

## 2022-08-20 RX ADMIN — Medication SCH MG: at 22:55

## 2022-08-20 RX ADMIN — NICOTINE POLACRILEX PRN MG: 2 GUM, CHEWING ORAL at 10:59

## 2022-08-20 RX ADMIN — HYDROXYZINE PAMOATE SCH MG: 25 CAPSULE ORAL at 22:54

## 2022-08-20 RX ADMIN — HYDROXYZINE PAMOATE SCH MG: 25 CAPSULE ORAL at 17:56

## 2022-08-20 RX ADMIN — NICOTINE POLACRILEX PRN MG: 2 GUM, CHEWING ORAL at 22:56

## 2022-08-20 RX ADMIN — GABAPENTIN SCH MG: 100 CAPSULE ORAL at 13:29

## 2022-08-20 RX ADMIN — NICOTINE POLACRILEX PRN MG: 2 GUM, CHEWING ORAL at 17:57

## 2022-08-20 RX ADMIN — NICOTINE POLACRILEX PRN MG: 2 GUM, CHEWING ORAL at 15:29

## 2022-08-20 RX ADMIN — HYDROXYZINE PAMOATE SCH MG: 25 CAPSULE ORAL at 05:41

## 2022-08-20 RX ADMIN — HYDROXYZINE PAMOATE SCH MG: 25 CAPSULE ORAL at 13:29

## 2022-08-20 RX ADMIN — METHOCARBAMOL PRN MG: 500 TABLET ORAL at 22:55

## 2022-08-20 RX ADMIN — GABAPENTIN SCH MG: 100 CAPSULE ORAL at 05:41

## 2022-08-20 RX ADMIN — Medication SCH TAB: at 10:58

## 2022-08-20 RX ADMIN — HYDROXYZINE PAMOATE SCH MG: 25 CAPSULE ORAL at 10:58

## 2022-08-21 RX ADMIN — HYDROXYZINE PAMOATE SCH MG: 25 CAPSULE ORAL at 18:56

## 2022-08-21 RX ADMIN — NICOTINE POLACRILEX PRN MG: 2 GUM, CHEWING ORAL at 05:22

## 2022-08-21 RX ADMIN — Medication SCH MG: at 22:01

## 2022-08-21 RX ADMIN — IBUPROFEN PRN MG: 400 TABLET, FILM COATED ORAL at 18:57

## 2022-08-21 RX ADMIN — HYDROXYZINE PAMOATE SCH: 25 CAPSULE ORAL at 07:00

## 2022-08-21 RX ADMIN — Medication SCH: at 12:18

## 2022-08-21 RX ADMIN — METHOCARBAMOL PRN MG: 500 TABLET ORAL at 18:56

## 2022-08-21 RX ADMIN — NICOTINE POLACRILEX PRN MG: 2 GUM, CHEWING ORAL at 12:18

## 2022-08-21 RX ADMIN — GABAPENTIN SCH MG: 100 CAPSULE ORAL at 05:20

## 2022-08-21 RX ADMIN — HYDROXYZINE PAMOATE SCH MG: 25 CAPSULE ORAL at 13:27

## 2022-08-21 RX ADMIN — NICOTINE POLACRILEX PRN MG: 2 GUM, CHEWING ORAL at 02:48

## 2022-08-21 RX ADMIN — HYDROXYZINE PAMOATE SCH MG: 25 CAPSULE ORAL at 22:01

## 2022-08-21 RX ADMIN — HYDROXYZINE PAMOATE SCH: 25 CAPSULE ORAL at 12:18

## 2022-08-21 RX ADMIN — GABAPENTIN SCH MG: 100 CAPSULE ORAL at 13:27

## 2022-08-21 RX ADMIN — GABAPENTIN SCH MG: 100 CAPSULE ORAL at 22:01

## 2022-08-22 VITALS
RESPIRATION RATE: 18 BRPM | HEART RATE: 63 BPM | TEMPERATURE: 96.9 F | SYSTOLIC BLOOD PRESSURE: 106 MMHG | DIASTOLIC BLOOD PRESSURE: 58 MMHG

## 2022-08-22 RX ADMIN — GABAPENTIN SCH MG: 100 CAPSULE ORAL at 06:09

## 2022-08-22 RX ADMIN — HYDROXYZINE PAMOATE SCH MG: 25 CAPSULE ORAL at 13:45

## 2022-08-22 RX ADMIN — IBUPROFEN PRN MG: 400 TABLET, FILM COATED ORAL at 10:32

## 2022-08-22 RX ADMIN — HYDROXYZINE PAMOATE SCH MG: 25 CAPSULE ORAL at 06:09

## 2022-08-22 RX ADMIN — Medication SCH TAB: at 10:32

## 2022-08-22 RX ADMIN — GABAPENTIN SCH MG: 100 CAPSULE ORAL at 13:45

## 2022-08-22 RX ADMIN — HYDROXYZINE PAMOATE SCH: 25 CAPSULE ORAL at 10:32

## 2022-10-26 NOTE — ED PROVIDER NOTE - PRO INTERPRETER NEED 2
Subjective     Patient ID: Eloise Goodman is a 47 year old year old woman     HPI: 46 yo presents to establish care/annual physical  Also wants to discuss treatment for seasonal affective disorder and attention deficit    Seasonal depression- started a couple years ago  Discovered that when it gets dark, she wants to go to bed immediately  Also gets more enraged, above baseline of simmering rage  Has been taking MELCHOR-E prescribed by previous doctor. [place] immediate effect when she started taking it. Feels like it's not enough anymore. Was in the car recently and had an upsetting experience where she burst in to tears and so was prompted to make this appointment.  She wants to take the next step in treatment of her depression.  Also discussed with this provider other treatment options of SAD lamp, prozac, zoloft or wellbutrin. She is most interested in Wellbutrin for it's attention treating qualities and side effect profile.    Insomnia - difficulty staying asleep. Will wake up at 3 a.m. and will be awake for two hours.   Is a morning person so she doesn't like having to sleep in to get rested.    Having a harder time at work getting things done. Is a huge procrastinator. Uses this as a way to get other things done.  Takes as much time as she has available to get things done  Interested in evaluation for ADD  Never struggled in school, in her job performance, or with attention in relationships    Gyn: G0  LMP: monthly  Contraception: none  STI screening: declined    Pap hx: NILM/HPV- 2/7/22  Breast CA screening: mammo/US done 6/1/22  Colon CA screening: has never had colonoscopy. Got referral to see GI Dr. Rubi but hasn't made appointment yet. Notices blood in stool when she is on her period. Knows blood is not coming from her vaginal. Doesn't feel constipated but was told she was constipated in the past (large stool burden appreciated on abdominal xray done in 2019).    Immunizations: due for flu shot, covid  booster, Tdap. Had COVID 19 in July and so wants to postpone COVID booster until the holidays when she'll be around family.  Diet: pretty healthy  Exercise: sits a lot for her job; does a scheduled workout twice a week  Social: Minimal alcohol use.  No tobacco or drug use.  Sexucally active with male partners.    Patient's medications, allergies, past medical, surgical, social and family histories were reviewed and updated as appropriate.    Objective   Visit Vitals  /76 (BP Location: LUE - Left upper extremity, Patient Position: Sitting, Cuff Size: Regular)   Pulse 92   Temp 97.1 °F (36.2 °C) (Skin)   Resp 16   Ht 5' 5.63\" (1.667 m)   Wt 75.4 kg (166 lb 2 oz)   LMP 10/04/2022   SpO2 98%   BMI 27.12 kg/m²       Weight    10/26/22 1459   Weight: 75.4 kg (166 lb 2 oz)         Physical Exam  Vitals reviewed.   HENT:      Head: Normocephalic.      Right Ear: Tympanic membrane and ear canal normal.      Left Ear: Tympanic membrane and ear canal normal.   Eyes:      Conjunctiva/sclera: Conjunctivae normal.   Cardiovascular:      Rate and Rhythm: Normal rate and regular rhythm.      Heart sounds: Normal heart sounds.   Pulmonary:      Effort: Pulmonary effort is normal. No respiratory distress.      Breath sounds: Normal breath sounds. No wheezing or rales.   Abdominal:      General: Bowel sounds are normal. There is no distension.      Palpations: Abdomen is soft.      Tenderness: There is no abdominal tenderness. There is no rebound.   Musculoskeletal:         General: No tenderness or deformity. Normal range of motion.      Cervical back: Normal range of motion.   Skin:     General: Skin is warm and dry.   Neurological:      Mental Status: She is alert and oriented to person, place, and time.      Gait: Gait is intact.   Psychiatric:         Mood and Affect: Mood and affect normal.         Cognition and Memory: Memory normal.         Judgment: Judgment normal.         Assessment & Plan:   Problem List Items  Addressed This Visit        Health Encounters    Health maintenance examination - Primary     -Cervical CA screening: last pap NILM/HPV- 2/7/22  -Breast CA screening: mammo/US done 6/1/22  -Colon CA screening: Discussed FOBT vs colonoscopy. Pt will establish care for GI for first screening colonoscopy    -Contraception: condoms  -Lipids screening  -HIV/STI screening: declines  -BP normal, BMI 27  -Immunizations: flu shot and Tdap today. Pt had COVID 19 infection recently and will time booster for closer to the holidays  -Discussed diet and exercise recommendations  -Negative tobacco, alcohol, and depression screening.            Relevant Orders    COMPREHENSIVE METABOLIC PANEL    LIPID PANEL WITH REFLEX    GLYCOHEMOGLOBIN    CBC WITH DIFFERENTIAL    THYROID STIMULATING HORMONE REFLEX       Mental Health    Mild major depression (CMS/HCC)     Recent PHQ 2/9 Score    PHQ 2:  Date Adult PHQ 2 Score Adult PHQ 2 Interpretation   10/26/2022 0 No further screening needed       PHQ 9:  Date Adult PHQ 9 Score Adult PHQ 9 Interpretation   10/26/2022 6 Mild Depression     Uncontrolled. Currently experiencing symptoms of seasonal affective disorder.  Discussed first line pharmacotherapy options are SSRI's. Pt interested in starting Wellbutrin for decreased risk of sexual dysfunction and possibility that this would help with attention deficit. We discussed the SR vs XR options and pt opted for XR.   Will start Wellbutrin 150mg XR one tab daily and increase to two tabs daily after about a week or when ready.  Discussed potential side effects of new medication.  Discussed that I am not familiar with SAMe as a treatment for depression.   Per UpToDate,  \"S-adenosyl methionine (SAMe) is a metabolite of folate that facilitates the synthesis of neurotransmitters (including dopamine, norepinephrine, and serotonin) and is available in the [place] over-the-counter as a dietary supplement. It is not clear whether SAMe is beneficial  for initial treatment of unipolar major depression as monotherapy because of methodologic problems in studies that have evaluated the drug.\" Pt reports some benefit with SAMe but not sufficient benefit. She will wean off of SAMe over the next two weeks before starting Wellbutrin.  F/u in 2 months              Neuro    Attention deficit     I suspect attention deficit symptoms may be related to mild depression symptoms.   We are starting treatment with Wellbutrin which will likely help with attention.   Meanwhile, recommended that if pt is interested in workup for ADD, that she pursue formal evaluation with psychiatrist or, preferably, with a neuropsychologist. Referral provided for neuropsych.         Relevant Orders    SERVICE TO NEUROPSYCHOLOGY      Other Visit Diagnoses     Need for vaccination        Relevant Orders    TETANUS DIPHTHERIA ACELLULAR PERTUSSIS VACC, 11+ YRS (ADACEL) (Completed)    INFLUENZA QUADRIVALENT SPLIT PRES FREE 0.5 ML VACC, IM (FLULAVAL,FLUARIX,FLUZONE) (Completed)        I spent a total of 68 minutes caring for this patient on today's date including pre-charting, face-to-face time, review of labs, imaging, consultation notes, and documentation time.    Alicia Mcgee MD     English

## 2022-11-13 ENCOUNTER — EMERGENCY (EMERGENCY)
Facility: HOSPITAL | Age: 52
LOS: 1 days | Discharge: HOME | End: 2022-11-13
Attending: EMERGENCY MEDICINE | Admitting: EMERGENCY MEDICINE
Payer: COMMERCIAL

## 2022-11-13 VITALS
RESPIRATION RATE: 18 BRPM | WEIGHT: 149.91 LBS | TEMPERATURE: 98 F | HEART RATE: 66 BPM | DIASTOLIC BLOOD PRESSURE: 80 MMHG | SYSTOLIC BLOOD PRESSURE: 159 MMHG | OXYGEN SATURATION: 100 %

## 2022-11-13 PROCEDURE — 99283 EMERGENCY DEPT VISIT LOW MDM: CPT

## 2022-11-13 PROCEDURE — 99284 EMERGENCY DEPT VISIT MOD MDM: CPT

## 2022-11-13 PROCEDURE — 82962 GLUCOSE BLOOD TEST: CPT

## 2022-11-13 PROCEDURE — 93005 ELECTROCARDIOGRAM TRACING: CPT

## 2022-11-13 RX ORDER — ONDANSETRON 8 MG/1
4 TABLET, FILM COATED ORAL ONCE
Refills: 0 | Status: COMPLETED | OUTPATIENT
Start: 2022-11-13 | End: 2022-11-13

## 2022-11-13 RX ADMIN — Medication 2 MILLIGRAM(S): at 15:32

## 2022-11-13 RX ADMIN — Medication 50 MILLIGRAM(S): at 15:32

## 2022-11-13 RX ADMIN — ONDANSETRON 4 MILLIGRAM(S): 8 TABLET, FILM COATED ORAL at 15:32

## 2022-11-13 NOTE — ED PROVIDER NOTE - PATIENT PORTAL LINK FT
You can access the FollowMyHealth Patient Portal offered by [facility] by registering at the following website: [email or web address]. By joining [employer]’s FollowMyHealth portal, you will also be able to view your health information using other applications (apps) compatible with our system.

## 2022-11-13 NOTE — ED PROVIDER NOTE - CLINICAL SUMMARY MEDICAL DECISION MAKING FREE TEXT BOX
pt c/o alcohol withdrawal - no objective withdrawal symptoms, hemodynamically stable, no tachycardia, no htn, pt given zofran plus librium and ativan, reports that feels better -- tolerating po after, seen by case management -- detox resources given, pt happy w/dc plan
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
## 2022-11-13 NOTE — ED ADULT TRIAGE NOTE - CHIEF COMPLAINT QUOTE
Pt presents reporting alcohol withdrawals. <Last drink yesterday. Reports nausea, shakey vision, headache, unusual smells. Hx of withdrawal seizures.
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
## 2022-11-16 DIAGNOSIS — F10.10 ALCOHOL ABUSE, UNCOMPLICATED: ICD-10-CM

## 2022-11-16 DIAGNOSIS — R25.1 TREMOR, UNSPECIFIED: ICD-10-CM

## 2022-11-16 DIAGNOSIS — F10.939 ALCOHOL USE, UNSPECIFIED WITH WITHDRAWAL, UNSPECIFIED: ICD-10-CM

## 2022-11-16 DIAGNOSIS — R11.0 NAUSEA: ICD-10-CM

## 2022-12-20 ENCOUNTER — EMERGENCY (EMERGENCY)
Facility: HOSPITAL | Age: 52
LOS: 1 days | Discharge: HOME | End: 2022-12-20
Attending: EMERGENCY MEDICINE | Admitting: EMERGENCY MEDICINE
Payer: MEDICAID

## 2022-12-20 VITALS
WEIGHT: 139.99 LBS | TEMPERATURE: 100 F | HEART RATE: 89 BPM | HEIGHT: 69 IN | RESPIRATION RATE: 18 BRPM | DIASTOLIC BLOOD PRESSURE: 96 MMHG | OXYGEN SATURATION: 96 % | SYSTOLIC BLOOD PRESSURE: 166 MMHG

## 2022-12-20 VITALS
RESPIRATION RATE: 17 BRPM | OXYGEN SATURATION: 97 % | SYSTOLIC BLOOD PRESSURE: 123 MMHG | TEMPERATURE: 98 F | HEART RATE: 59 BPM | DIASTOLIC BLOOD PRESSURE: 70 MMHG

## 2022-12-20 DIAGNOSIS — L81.4 OTHER MELANIN HYPERPIGMENTATION: ICD-10-CM

## 2022-12-20 DIAGNOSIS — R50.9 FEVER, UNSPECIFIED: ICD-10-CM

## 2022-12-20 DIAGNOSIS — R74.02 ELEVATION OF LEVELS OF LACTIC ACID DEHYDROGENASE [LDH]: ICD-10-CM

## 2022-12-20 DIAGNOSIS — I10 ESSENTIAL (PRIMARY) HYPERTENSION: ICD-10-CM

## 2022-12-20 DIAGNOSIS — Z86.19 PERSONAL HISTORY OF OTHER INFECTIOUS AND PARASITIC DISEASES: ICD-10-CM

## 2022-12-20 DIAGNOSIS — F43.10 POST-TRAUMATIC STRESS DISORDER, UNSPECIFIED: ICD-10-CM

## 2022-12-20 DIAGNOSIS — F17.200 NICOTINE DEPENDENCE, UNSPECIFIED, UNCOMPLICATED: ICD-10-CM

## 2022-12-20 DIAGNOSIS — U07.1 COVID-19: ICD-10-CM

## 2022-12-20 DIAGNOSIS — F31.9 BIPOLAR DISORDER, UNSPECIFIED: ICD-10-CM

## 2022-12-20 DIAGNOSIS — R10.9 UNSPECIFIED ABDOMINAL PAIN: ICD-10-CM

## 2022-12-20 LAB
ALBUMIN SERPL ELPH-MCNC: 4.4 G/DL — SIGNIFICANT CHANGE UP (ref 3.3–5)
ALP SERPL-CCNC: 93 U/L — SIGNIFICANT CHANGE UP (ref 40–120)
ALT FLD-CCNC: 40 U/L — SIGNIFICANT CHANGE UP (ref 10–45)
AMPHET UR-MCNC: NEGATIVE — SIGNIFICANT CHANGE UP
ANION GAP SERPL CALC-SCNC: 9 MMOL/L — SIGNIFICANT CHANGE UP (ref 5–17)
ANISOCYTOSIS BLD QL: SLIGHT — SIGNIFICANT CHANGE UP
APAP SERPL-MCNC: <5 UG/ML — LOW (ref 10–30)
APPEARANCE UR: CLEAR — SIGNIFICANT CHANGE UP
APTT BLD: 36.5 SEC — HIGH (ref 27.5–35.5)
AST SERPL-CCNC: 48 U/L — HIGH (ref 10–40)
BARBITURATES UR SCN-MCNC: NEGATIVE — SIGNIFICANT CHANGE UP
BASE EXCESS BLDV CALC-SCNC: 8.2 MMOL/L — HIGH (ref -2–3)
BASOPHILS # BLD AUTO: 0.09 K/UL — SIGNIFICANT CHANGE UP (ref 0–0.2)
BASOPHILS NFR BLD AUTO: 1.7 % — SIGNIFICANT CHANGE UP (ref 0–2)
BENZODIAZ UR-MCNC: NEGATIVE — SIGNIFICANT CHANGE UP
BILIRUB SERPL-MCNC: 0.6 MG/DL — SIGNIFICANT CHANGE UP (ref 0.2–1.2)
BILIRUB UR-MCNC: NEGATIVE — SIGNIFICANT CHANGE UP
BUN SERPL-MCNC: 12 MG/DL — SIGNIFICANT CHANGE UP (ref 7–23)
CA-I SERPL-SCNC: 1.16 MMOL/L — SIGNIFICANT CHANGE UP (ref 1.15–1.33)
CALCIUM SERPL-MCNC: 9.4 MG/DL — SIGNIFICANT CHANGE UP (ref 8.4–10.5)
CHLORIDE SERPL-SCNC: 95 MMOL/L — LOW (ref 96–108)
CK MB CFR SERPL CALC: 4.8 NG/ML — SIGNIFICANT CHANGE UP (ref 0–6.7)
CK SERPL-CCNC: 250 U/L — HIGH (ref 30–200)
CO2 BLDV-SCNC: 36 MMOL/L — HIGH (ref 22–26)
CO2 SERPL-SCNC: 33 MMOL/L — HIGH (ref 22–31)
COCAINE METAB.OTHER UR-MCNC: NEGATIVE — SIGNIFICANT CHANGE UP
COLOR SPEC: YELLOW — SIGNIFICANT CHANGE UP
CREAT SERPL-MCNC: 0.7 MG/DL — SIGNIFICANT CHANGE UP (ref 0.5–1.3)
DIFF PNL FLD: NEGATIVE — SIGNIFICANT CHANGE UP
EGFR: 112 ML/MIN/1.73M2 — SIGNIFICANT CHANGE UP
EOSINOPHIL # BLD AUTO: 0.05 K/UL — SIGNIFICANT CHANGE UP (ref 0–0.5)
EOSINOPHIL NFR BLD AUTO: 0.9 % — SIGNIFICANT CHANGE UP (ref 0–6)
ETHANOL SERPL-MCNC: <10 MG/DL — SIGNIFICANT CHANGE UP (ref 0–10)
FLUAV AG NPH QL: SIGNIFICANT CHANGE UP
FLUBV AG NPH QL: SIGNIFICANT CHANGE UP
GAS PNL BLDV: 134 MMOL/L — LOW (ref 136–145)
GAS PNL BLDV: SIGNIFICANT CHANGE UP
GLUCOSE SERPL-MCNC: 102 MG/DL — HIGH (ref 70–99)
GLUCOSE UR QL: NEGATIVE — SIGNIFICANT CHANGE UP
HCO3 BLDV-SCNC: 34 MMOL/L — HIGH (ref 22–29)
HCT VFR BLD CALC: 43.7 % — SIGNIFICANT CHANGE UP (ref 39–50)
HGB BLD-MCNC: 13.6 G/DL — SIGNIFICANT CHANGE UP (ref 13–17)
INR BLD: 1.08 — SIGNIFICANT CHANGE UP (ref 0.88–1.16)
KETONES UR-MCNC: NEGATIVE — SIGNIFICANT CHANGE UP
LACTATE SERPL-SCNC: 2.1 MMOL/L — HIGH (ref 0.5–2)
LEUKOCYTE ESTERASE UR-ACNC: NEGATIVE — SIGNIFICANT CHANGE UP
LYMPHOCYTES # BLD AUTO: 0.74 K/UL — LOW (ref 1–3.3)
LYMPHOCYTES # BLD AUTO: 13.9 % — SIGNIFICANT CHANGE UP (ref 13–44)
MACROCYTES BLD QL: SLIGHT — SIGNIFICANT CHANGE UP
MANUAL SMEAR VERIFICATION: SIGNIFICANT CHANGE UP
MCHC RBC-ENTMCNC: 28.6 PG — SIGNIFICANT CHANGE UP (ref 27–34)
MCHC RBC-ENTMCNC: 31.1 GM/DL — LOW (ref 32–36)
MCV RBC AUTO: 91.8 FL — SIGNIFICANT CHANGE UP (ref 80–100)
METHADONE UR-MCNC: POSITIVE
MICROCYTES BLD QL: SLIGHT — SIGNIFICANT CHANGE UP
MONOCYTES # BLD AUTO: 0.55 K/UL — SIGNIFICANT CHANGE UP (ref 0–0.9)
MONOCYTES NFR BLD AUTO: 10.4 % — SIGNIFICANT CHANGE UP (ref 2–14)
NEUTROPHILS # BLD AUTO: 3.87 K/UL — SIGNIFICANT CHANGE UP (ref 1.8–7.4)
NEUTROPHILS NFR BLD AUTO: 73.1 % — SIGNIFICANT CHANGE UP (ref 43–77)
NITRITE UR-MCNC: NEGATIVE — SIGNIFICANT CHANGE UP
OPIATES UR-MCNC: NEGATIVE — SIGNIFICANT CHANGE UP
OVALOCYTES BLD QL SMEAR: SLIGHT — SIGNIFICANT CHANGE UP
PCO2 BLDV: 53 MMHG — SIGNIFICANT CHANGE UP (ref 42–55)
PCP SPEC-MCNC: SIGNIFICANT CHANGE UP
PCP UR-MCNC: NEGATIVE — SIGNIFICANT CHANGE UP
PH BLDV: 7.42 — SIGNIFICANT CHANGE UP (ref 7.32–7.43)
PH UR: 7 — SIGNIFICANT CHANGE UP (ref 5–8)
PLAT MORPH BLD: NORMAL — SIGNIFICANT CHANGE UP
PLATELET # BLD AUTO: 155 K/UL — SIGNIFICANT CHANGE UP (ref 150–400)
PO2 BLDV: <33 MMHG — SIGNIFICANT CHANGE UP (ref 25–45)
POLYCHROMASIA BLD QL SMEAR: SLIGHT — SIGNIFICANT CHANGE UP
POTASSIUM BLDV-SCNC: 4.1 MMOL/L — SIGNIFICANT CHANGE UP (ref 3.5–5.1)
POTASSIUM SERPL-MCNC: 3.8 MMOL/L — SIGNIFICANT CHANGE UP (ref 3.5–5.3)
POTASSIUM SERPL-SCNC: 3.8 MMOL/L — SIGNIFICANT CHANGE UP (ref 3.5–5.3)
PROT SERPL-MCNC: 7.7 G/DL — SIGNIFICANT CHANGE UP (ref 6–8.3)
PROT UR-MCNC: NEGATIVE MG/DL — SIGNIFICANT CHANGE UP
PROTHROM AB SERPL-ACNC: 12.9 SEC — SIGNIFICANT CHANGE UP (ref 10.5–13.4)
RBC # BLD: 4.76 M/UL — SIGNIFICANT CHANGE UP (ref 4.2–5.8)
RBC # FLD: 13.8 % — SIGNIFICANT CHANGE UP (ref 10.3–14.5)
RBC BLD AUTO: ABNORMAL
RSV RNA NPH QL NAA+NON-PROBE: SIGNIFICANT CHANGE UP
SALICYLATES SERPL-MCNC: <0.3 MG/DL — LOW (ref 2.8–20)
SAO2 % BLDV: 52.4 % — LOW (ref 67–88)
SARS-COV-2 RNA SPEC QL NAA+PROBE: DETECTED
SODIUM SERPL-SCNC: 137 MMOL/L — SIGNIFICANT CHANGE UP (ref 135–145)
SP GR SPEC: 1.01 — SIGNIFICANT CHANGE UP (ref 1–1.03)
SPHEROCYTES BLD QL SMEAR: SLIGHT — SIGNIFICANT CHANGE UP
THC UR QL: NEGATIVE — SIGNIFICANT CHANGE UP
UROBILINOGEN FLD QL: 0.2 E.U./DL — SIGNIFICANT CHANGE UP
WBC # BLD: 5.3 K/UL — SIGNIFICANT CHANGE UP (ref 3.8–10.5)
WBC # FLD AUTO: 5.3 K/UL — SIGNIFICANT CHANGE UP (ref 3.8–10.5)

## 2022-12-20 PROCEDURE — 82330 ASSAY OF CALCIUM: CPT

## 2022-12-20 PROCEDURE — 81003 URINALYSIS AUTO W/O SCOPE: CPT

## 2022-12-20 PROCEDURE — 87086 URINE CULTURE/COLONY COUNT: CPT

## 2022-12-20 PROCEDURE — 82803 BLOOD GASES ANY COMBINATION: CPT

## 2022-12-20 PROCEDURE — 96374 THER/PROPH/DIAG INJ IV PUSH: CPT

## 2022-12-20 PROCEDURE — 87040 BLOOD CULTURE FOR BACTERIA: CPT

## 2022-12-20 PROCEDURE — 87637 SARSCOV2&INF A&B&RSV AMP PRB: CPT

## 2022-12-20 PROCEDURE — 71046 X-RAY EXAM CHEST 2 VIEWS: CPT

## 2022-12-20 PROCEDURE — 84295 ASSAY OF SERUM SODIUM: CPT

## 2022-12-20 PROCEDURE — 83605 ASSAY OF LACTIC ACID: CPT

## 2022-12-20 PROCEDURE — 93005 ELECTROCARDIOGRAM TRACING: CPT

## 2022-12-20 PROCEDURE — 36415 COLL VENOUS BLD VENIPUNCTURE: CPT

## 2022-12-20 PROCEDURE — 82550 ASSAY OF CK (CPK): CPT

## 2022-12-20 PROCEDURE — 84132 ASSAY OF SERUM POTASSIUM: CPT

## 2022-12-20 PROCEDURE — 71046 X-RAY EXAM CHEST 2 VIEWS: CPT | Mod: 26

## 2022-12-20 PROCEDURE — 80053 COMPREHEN METABOLIC PANEL: CPT

## 2022-12-20 PROCEDURE — 85730 THROMBOPLASTIN TIME PARTIAL: CPT

## 2022-12-20 PROCEDURE — 82553 CREATINE MB FRACTION: CPT

## 2022-12-20 PROCEDURE — 85025 COMPLETE CBC W/AUTO DIFF WBC: CPT

## 2022-12-20 PROCEDURE — 99285 EMERGENCY DEPT VISIT HI MDM: CPT | Mod: 25

## 2022-12-20 PROCEDURE — 85610 PROTHROMBIN TIME: CPT

## 2022-12-20 PROCEDURE — 93010 ELECTROCARDIOGRAM REPORT: CPT

## 2022-12-20 PROCEDURE — 80307 DRUG TEST PRSMV CHEM ANLYZR: CPT

## 2022-12-20 RX ORDER — KETOROLAC TROMETHAMINE 30 MG/ML
15 SYRINGE (ML) INJECTION ONCE
Refills: 0 | Status: DISCONTINUED | OUTPATIENT
Start: 2022-12-20 | End: 2022-12-20

## 2022-12-20 RX ORDER — SODIUM CHLORIDE 9 MG/ML
1000 INJECTION INTRAMUSCULAR; INTRAVENOUS; SUBCUTANEOUS ONCE
Refills: 0 | Status: COMPLETED | OUTPATIENT
Start: 2022-12-20 | End: 2022-12-20

## 2022-12-20 RX ORDER — ACETAMINOPHEN 500 MG
650 TABLET ORAL ONCE
Refills: 0 | Status: COMPLETED | OUTPATIENT
Start: 2022-12-20 | End: 2022-12-20

## 2022-12-20 RX ADMIN — SODIUM CHLORIDE 1000 MILLILITER(S): 9 INJECTION INTRAMUSCULAR; INTRAVENOUS; SUBCUTANEOUS at 12:50

## 2022-12-20 RX ADMIN — Medication 15 MILLIGRAM(S): at 12:50

## 2022-12-20 RX ADMIN — Medication 650 MILLIGRAM(S): at 12:50

## 2022-12-20 NOTE — ED PROVIDER NOTE - PROGRESS NOTE DETAILS
[place]: Cl 95, bicarb 33, AST 48, lactate minimally elevated at 2.1, , other labs grossly wnl. UA no infection. COVID+, likely etiology of symptoms. CXR wnl. Remains very well appearing.   Discussed importance of outpt follow up and return precautions. Clinically no indication for further emergent ED workup or hospitalization at this time. Stable for dc, outpt f/u.

## 2022-12-20 NOTE — ED PROVIDER NOTE - CLINICAL SUMMARY MEDICAL DECISION MAKING FREE TEXT BOX
52M PMH HCV, PTSD, bipolar disorder, polysubstance abuse (opioid use - on methadone 120 mg daily, benzodiazepines, cocaine, amphetamines, ETOH), p/w several complaints. Pt has subjective fevers since yesterday. Also body aches. Also bitemporal HA, gradual onset, similar to prior, since yesterday (triage notes 1w). Also chills for 6 months. On ROS pt notes minimal R flank pain x1w. No other systemic symptoms.   Last etoh 2d ago. States last IVDU (heroin) was ~1mo ago. Not on any daily meds.   100.3 oral temp, mild HTN, other vitals wnl. Exam as above.  ddx: Likely infectious. Possibly viral. Low suspicion bacteremia. Clinically no specific toxidrome or withdrawal syndrome.   Labs, CXR, UA, IVF/symptom control, reassess.

## 2022-12-20 NOTE — ED PROVIDER NOTE - PHYSICAL EXAMINATION
b/l LE hyperpigmented skin, trace pitting edema  steady unassisted gait  PERRL, EOMI, no nystagmus. CN intact. Strength 5/5. No pronator drift. Sensation intact. Normal speech, no dysarthria. No carotid bruits.   No tonsillar hypertrophy, exudates, erythema. No obvious LAD. No trismus. No stridor/drooling, neck FROM. Normal sounding voice. Uvula midline. No facial swelling.

## 2022-12-20 NOTE — ED PROVIDER NOTE - PATIENT PORTAL LINK FT
no You can access the FollowMyHealth Patient Portal offered by [facility] by registering at the following website: [email or web address]. By joining [employer]’s FollowMyHealth portal, you will also be able to view your health information using other applications (apps) compatible with our system.

## 2022-12-20 NOTE — ED ADULT TRIAGE NOTE - CCCP TRG CHIEF CMPLNT
flu-like symptoms Complex Repair And Dorsal Nasal Flap Text: The defect edges were debeveled with a #15 scalpel blade.  The primary defect was closed partially with a complex linear closure.  Given the location of the remaining defect, shape of the defect and the proximity to free margins a dorsal nasal flap was deemed most appropriate for complete closure of the defect.  Using a sterile surgical marker, an appropriate flap was drawn incorporating the defect and placing the expected incisions within the relaxed skin tension lines where possible.    The area thus outlined was incised deep to adipose tissue with a #15 scalpel blade.  The skin margins were undermined to an appropriate distance in all directions utilizing iris scissors.

## 2022-12-20 NOTE — ED ADULT TRIAGE NOTE - CHIEF COMPLAINT QUOTE
Pt c/o body aches, congestion, HA x1wk. Hx of polysubstance abuse and Hep C. Last drink 2 days ago. Denies cough, chest pain, SOB, n/v.

## 2022-12-20 NOTE — ED PROVIDER NOTE - NSFOLLOWUPINSTRUCTIONS_ED_ALL_ED_FT
Can take tylenol 650mg every 6hrs as needed for pain or fever.    Can also take motrin 600mg every 6hrs as needed for pain (WARNING: Use may cause stomach issues/problems. Take with food. Prolonged use can also cause kidney issues.).     Stay well hydrated and make sure you eat even if you have a poor appetite.     You should RETURN  to ER for persistent vomit, uncontrolled or worsening abdominal pain, worsening breathing (sometimes manifests as breathing heavier or quicker, unable to speak full sentences), worsening lightheaded, or overall feeling much worse. You may need additional work up or interventions at that time.     It is important to follow up with your primary doctor within 1-2 days.    Viral Respiratory Infection    A viral respiratory infection is an illness that affects parts of the body used for breathing, like the lungs, nose, and throat. It is caused by a germ called a virus. Symptoms can include runny nose, coughing, sneezing, fatigue, body aches, sore throat, fever, or headache. Over the counter medicine can be used to manage the symptoms but the infection typically goes away on its own in 5 to 10 days.     SEEK IMMEDIATE MEDICAL CARE IF YOU HAVE ANY OF THE FOLLOWING SYMPTOMS: shortness of breath, chest pain, fever over 10 days, or lightheadedness/dizziness.

## 2022-12-20 NOTE — ED ADULT NURSE NOTE - NSIMPLEMENTINTERV_GEN_ALL_ED
Implemented All Fall Risk Interventions:  [place] to call system. Call bell, personal items and telephone within reach. Instruct patient to call for assistance. Room bathroom lighting operational. Non-slip footwear when patient is off stretcher. Physically safe environment: no spills, clutter or unnecessary equipment. Stretcher in lowest position, wheels locked, appropriate side rails in place. Provide visual cue, wrist band, yellow gown, etc. Monitor gait and stability. Monitor for mental status changes and reorient to person, place, and time. Review medications for side effects contributing to fall risk. Reinforce activity limits and safety measures with patient and family.

## 2022-12-20 NOTE — ED ADULT NURSE NOTE - OBJECTIVE STATEMENT
Pt. a&ox4 ambulatory with steady gait, known hx of Hep C and polysubstance IV / snorting and ETOH abuse of heroin, cocaine, methadone user, last dose this am PTA, comes to ED s/p 1.5-2weeks of generalized weakness, malaise, exhaustion with minimal exertion, occasional palpitations and SOB / ROBLES, frontal HA, subjective f/c, BA, and BL blurred vision / "difficulty focusing his eyes". Last IV use was 2 weeks ago, last drink (drinks approx 1 pint Vodka per day) was 2 days ago. Pt. reports "I was weaning myself off, stopped IV use 2 weeks ago, was snorting less and less 1 week ago, I stopped drinking, and when I got methadone this am and felt no better, I began to panic ; I just feel like I am getting worse and something is wrong". Pt. denies n/v/d, urinary s/s, cp.

## 2022-12-20 NOTE — ED PROVIDER NOTE - PRINCIPAL DIAGNOSIS
2019 novel coronavirus disease (COVID-19) Mom had delayed awakening; Denies family Hx malignant hyperthermia/none

## 2022-12-21 LAB
CULTURE RESULTS: NO GROWTH — SIGNIFICANT CHANGE UP
SPECIMEN SOURCE: SIGNIFICANT CHANGE UP

## 2022-12-25 LAB
CULTURE RESULTS: SIGNIFICANT CHANGE UP
CULTURE RESULTS: SIGNIFICANT CHANGE UP
SPECIMEN SOURCE: SIGNIFICANT CHANGE UP
SPECIMEN SOURCE: SIGNIFICANT CHANGE UP

## 2023-01-25 ENCOUNTER — EMERGENCY (EMERGENCY)
Facility: HOSPITAL | Age: 53
LOS: 1 days | Discharge: LEFT BEFORE DISCHARGE | End: 2023-01-25
Admitting: EMERGENCY MEDICINE
Payer: MEDICAID

## 2023-01-25 VITALS — HEIGHT: 69 IN

## 2023-01-25 PROCEDURE — L9992: CPT

## 2023-01-27 DIAGNOSIS — Z53.21 PROCEDURE AND TREATMENT NOT CARRIED OUT DUE TO PATIENT LEAVING PRIOR TO BEING SEEN BY HEALTH CARE PROVIDER: ICD-10-CM

## 2023-02-15 ENCOUNTER — EMERGENCY (EMERGENCY)
Facility: HOSPITAL | Age: 53
LOS: 1 days | Discharge: LEFT BEFORE DISCHARGE | End: 2023-02-15
Attending: EMERGENCY MEDICINE | Admitting: EMERGENCY MEDICINE
Payer: MEDICAID

## 2023-02-15 VITALS
OXYGEN SATURATION: 94 % | HEART RATE: 103 BPM | RESPIRATION RATE: 16 BRPM | HEIGHT: 69 IN | TEMPERATURE: 99 F | WEIGHT: 154.98 LBS | SYSTOLIC BLOOD PRESSURE: 136 MMHG | DIASTOLIC BLOOD PRESSURE: 91 MMHG

## 2023-02-15 VITALS
DIASTOLIC BLOOD PRESSURE: 80 MMHG | OXYGEN SATURATION: 95 % | TEMPERATURE: 98 F | SYSTOLIC BLOOD PRESSURE: 122 MMHG | HEART RATE: 73 BPM | RESPIRATION RATE: 18 BRPM

## 2023-02-15 DIAGNOSIS — Z86.59 PERSONAL HISTORY OF OTHER MENTAL AND BEHAVIORAL DISORDERS: ICD-10-CM

## 2023-02-15 DIAGNOSIS — Z53.29 PROCEDURE AND TREATMENT NOT CARRIED OUT BECAUSE OF PATIENT'S DECISION FOR OTHER REASONS: ICD-10-CM

## 2023-02-15 DIAGNOSIS — Z86.74 PERSONAL HISTORY OF SUDDEN CARDIAC ARREST: ICD-10-CM

## 2023-02-15 DIAGNOSIS — Z86.19 PERSONAL HISTORY OF OTHER INFECTIOUS AND PARASITIC DISEASES: ICD-10-CM

## 2023-02-15 DIAGNOSIS — F43.10 POST-TRAUMATIC STRESS DISORDER, UNSPECIFIED: ICD-10-CM

## 2023-02-15 DIAGNOSIS — F31.9 BIPOLAR DISORDER, UNSPECIFIED: ICD-10-CM

## 2023-02-15 DIAGNOSIS — R51.9 HEADACHE, UNSPECIFIED: ICD-10-CM

## 2023-02-15 DIAGNOSIS — Z20.822 CONTACT WITH AND (SUSPECTED) EXPOSURE TO COVID-19: ICD-10-CM

## 2023-02-15 DIAGNOSIS — I10 ESSENTIAL (PRIMARY) HYPERTENSION: ICD-10-CM

## 2023-02-15 LAB
ALBUMIN SERPL ELPH-MCNC: 3.8 G/DL — SIGNIFICANT CHANGE UP (ref 3.3–5)
ALP SERPL-CCNC: 83 U/L — SIGNIFICANT CHANGE UP (ref 40–120)
ALT FLD-CCNC: SIGNIFICANT CHANGE UP U/L (ref 10–45)
ANION GAP SERPL CALC-SCNC: 11 MMOL/L — SIGNIFICANT CHANGE UP (ref 5–17)
AST SERPL-CCNC: SIGNIFICANT CHANGE UP U/L (ref 10–40)
BASOPHILS # BLD AUTO: 0.05 K/UL — SIGNIFICANT CHANGE UP (ref 0–0.2)
BASOPHILS NFR BLD AUTO: 0.6 % — SIGNIFICANT CHANGE UP (ref 0–2)
BILIRUB SERPL-MCNC: 0.6 MG/DL — SIGNIFICANT CHANGE UP (ref 0.2–1.2)
BUN SERPL-MCNC: 11 MG/DL — SIGNIFICANT CHANGE UP (ref 7–23)
CALCIUM SERPL-MCNC: 9.3 MG/DL — SIGNIFICANT CHANGE UP (ref 8.4–10.5)
CHLORIDE SERPL-SCNC: 102 MMOL/L — SIGNIFICANT CHANGE UP (ref 96–108)
CO2 SERPL-SCNC: 25 MMOL/L — SIGNIFICANT CHANGE UP (ref 22–31)
CREAT SERPL-MCNC: 0.62 MG/DL — SIGNIFICANT CHANGE UP (ref 0.5–1.3)
EGFR: 116 ML/MIN/1.73M2 — SIGNIFICANT CHANGE UP
EOSINOPHIL # BLD AUTO: 0.3 K/UL — SIGNIFICANT CHANGE UP (ref 0–0.5)
EOSINOPHIL NFR BLD AUTO: 3.5 % — SIGNIFICANT CHANGE UP (ref 0–6)
ETHANOL SERPL-MCNC: <10 MG/DL — SIGNIFICANT CHANGE UP (ref 0–10)
GLUCOSE SERPL-MCNC: 73 MG/DL — SIGNIFICANT CHANGE UP (ref 70–99)
HCT VFR BLD CALC: 42.7 % — SIGNIFICANT CHANGE UP (ref 39–50)
HGB BLD-MCNC: 13.3 G/DL — SIGNIFICANT CHANGE UP (ref 13–17)
IMM GRANULOCYTES NFR BLD AUTO: 0.1 % — SIGNIFICANT CHANGE UP (ref 0–0.9)
LYMPHOCYTES # BLD AUTO: 2.15 K/UL — SIGNIFICANT CHANGE UP (ref 1–3.3)
LYMPHOCYTES # BLD AUTO: 25.4 % — SIGNIFICANT CHANGE UP (ref 13–44)
MCHC RBC-ENTMCNC: 28.7 PG — SIGNIFICANT CHANGE UP (ref 27–34)
MCHC RBC-ENTMCNC: 31.1 GM/DL — LOW (ref 32–36)
MCV RBC AUTO: 92 FL — SIGNIFICANT CHANGE UP (ref 80–100)
MONOCYTES # BLD AUTO: 0.73 K/UL — SIGNIFICANT CHANGE UP (ref 0–0.9)
MONOCYTES NFR BLD AUTO: 8.6 % — SIGNIFICANT CHANGE UP (ref 2–14)
NEUTROPHILS # BLD AUTO: 5.23 K/UL — SIGNIFICANT CHANGE UP (ref 1.8–7.4)
NEUTROPHILS NFR BLD AUTO: 61.8 % — SIGNIFICANT CHANGE UP (ref 43–77)
NRBC # BLD: 0 /100 WBCS — SIGNIFICANT CHANGE UP (ref 0–0)
PLATELET # BLD AUTO: 175 K/UL — SIGNIFICANT CHANGE UP (ref 150–400)
POTASSIUM SERPL-MCNC: SIGNIFICANT CHANGE UP MMOL/L (ref 3.5–5.3)
POTASSIUM SERPL-SCNC: SIGNIFICANT CHANGE UP MMOL/L (ref 3.5–5.3)
PROT SERPL-MCNC: 6.7 G/DL — SIGNIFICANT CHANGE UP (ref 6–8.3)
RBC # BLD: 4.64 M/UL — SIGNIFICANT CHANGE UP (ref 4.2–5.8)
RBC # FLD: 14.7 % — HIGH (ref 10.3–14.5)
SARS-COV-2 RNA SPEC QL NAA+PROBE: NEGATIVE — SIGNIFICANT CHANGE UP
SODIUM SERPL-SCNC: 138 MMOL/L — SIGNIFICANT CHANGE UP (ref 135–145)
WBC # BLD: 8.47 K/UL — SIGNIFICANT CHANGE UP (ref 3.8–10.5)
WBC # FLD AUTO: 8.47 K/UL — SIGNIFICANT CHANGE UP (ref 3.8–10.5)

## 2023-02-15 PROCEDURE — 85025 COMPLETE CBC W/AUTO DIFF WBC: CPT

## 2023-02-15 PROCEDURE — 36415 COLL VENOUS BLD VENIPUNCTURE: CPT

## 2023-02-15 PROCEDURE — 99283 EMERGENCY DEPT VISIT LOW MDM: CPT

## 2023-02-15 PROCEDURE — 80307 DRUG TEST PRSMV CHEM ANLYZR: CPT

## 2023-02-15 PROCEDURE — 87635 SARS-COV-2 COVID-19 AMP PRB: CPT

## 2023-02-15 PROCEDURE — 80053 COMPREHEN METABOLIC PANEL: CPT

## 2023-02-15 PROCEDURE — 99284 EMERGENCY DEPT VISIT MOD MDM: CPT

## 2023-02-15 RX ORDER — KETOROLAC TROMETHAMINE 30 MG/ML
15 SYRINGE (ML) INJECTION ONCE
Refills: 0 | Status: DISCONTINUED | OUTPATIENT
Start: 2023-02-15 | End: 2023-02-15

## 2023-02-15 RX ORDER — IBUPROFEN 200 MG
600 TABLET ORAL ONCE
Refills: 0 | Status: COMPLETED | OUTPATIENT
Start: 2023-02-15 | End: 2023-02-15

## 2023-02-15 RX ORDER — METOCLOPRAMIDE HCL 10 MG
10 TABLET ORAL ONCE
Refills: 0 | Status: COMPLETED | OUTPATIENT
Start: 2023-02-15 | End: 2023-02-15

## 2023-02-15 RX ORDER — SODIUM CHLORIDE 9 MG/ML
1000 INJECTION INTRAMUSCULAR; INTRAVENOUS; SUBCUTANEOUS ONCE
Refills: 0 | Status: COMPLETED | OUTPATIENT
Start: 2023-02-15 | End: 2023-02-15

## 2023-02-15 RX ADMIN — Medication 600 MILLIGRAM(S): at 18:19

## 2023-02-15 RX ADMIN — Medication 600 MILLIGRAM(S): at 18:34

## 2023-02-15 RX ADMIN — Medication 10 MILLIGRAM(S): at 18:20

## 2023-02-15 NOTE — ED ADULT NURSE NOTE - HIV OFFER
Previously Declined (within the last year) Opt out Ivermectin Counseling:  Patient instructed to take medication on an empty stomach with a full glass of water.  Patient informed of potential adverse effects including but not limited to nausea, diarrhea, dizziness, itching, and swelling of the extremities or lymph nodes.  The patient verbalized understanding of the proper use and possible adverse effects of ivermectin.  All of the patient's questions and concerns were addressed.

## 2023-02-15 NOTE — ED PROVIDER NOTE - OBJECTIVE STATEMENT
PMH HCV, PTSD, bipolar disorder, polysubstance abuse (opioid use - on methadone 120 mg daily, benzodiazepines, cocaine, amphetamines, ETOH), here with headache. Reports bad headache, feels bothered by light. Also thinks he might be in alcohol withdrawal because his body feels stiff. Drinks daily, last this am. Beer. Says he got lost, missed his methadone program today. Denies trauma, fever.

## 2023-02-15 NOTE — ED ADULT NURSE NOTE - NS ED PATIENT SAFETY CONCERN
I called and left a voicemail, I offered patient a Clenpiq Sample  Sample placed at  for   Her procedure is in March 
No

## 2023-02-15 NOTE — ED ADULT NURSE NOTE - OBJECTIVE STATEMENT
Patient w/ Hx of alcohol abuse, drug abuse on Methadone, Bipolar, HCV, PTSD, c/o of headache w/ anxiety, tremors and sweating, no hallucinations, states drinks 1/2 pint vodka daily w/ beer. last drink 2am today.

## 2023-02-15 NOTE — ED ADULT NURSE REASSESSMENT NOTE - NS ED NURSE REASSESS COMMENT FT1
Patient a/oX 3, anxious, c/o of headache , no nausea/vomitting, no neuro deficits.  Vital signs stable.  Labs sent.  Unable to get IV access ;  2 RNs tried to get and patient stated does not want IV just wants Motrin and will leave.  Dr. Cross made aware.  Disposition pending.

## 2023-02-15 NOTE — ED ADULT NURSE REASSESSMENT NOTE - NS ED NURSE REASSESS COMMENT FT1
Reglan PO and IBuprofen 600mg PO adminsitered for pain and headache.  VItal signs stable.  Patient agitated and angry, states not getting treated enough and paying so much.  ED Asst. Mgr Agro aware and spoke with patient.  Patient stated will just leave, Dr. Cross aware and patient seen leaving the ED in stable condition.

## 2023-02-15 NOTE — ED PROVIDER NOTE - CLINICAL SUMMARY MEDICAL DECISION MAKING FREE TEXT BOX
reports headache after drinking earlier, worried about alcohol withdrawal but not exhibiting objective signs of withdrawal. labs ordered to eval dehydration, electrolyte imbalance, intoxication. plan for ivf/ reglan/toradol for headache but rn unable to obtain iv and pt refused further attempts. given reglan/ibuprofen po. eloped prior to results/ reassessment

## 2023-02-15 NOTE — ED ADULT TRIAGE NOTE - CHIEF COMPLAINT QUOTE
pt states "I'm in alcohol withdrawal" reports hx of similar last drink was around 2AM reports normally has 1/2 a pint of vodka a day. Head ache tremor and sweat noted pt denies cp

## 2023-02-19 ENCOUNTER — EMERGENCY (EMERGENCY)
Facility: HOSPITAL | Age: 53
LOS: 1 days | Discharge: LEFT BEFORE DISCHARGE | End: 2023-02-19
Attending: STUDENT IN AN ORGANIZED HEALTH CARE EDUCATION/TRAINING PROGRAM | Admitting: STUDENT IN AN ORGANIZED HEALTH CARE EDUCATION/TRAINING PROGRAM
Payer: MEDICAID

## 2023-02-19 VITALS
DIASTOLIC BLOOD PRESSURE: 76 MMHG | TEMPERATURE: 99 F | WEIGHT: 149.91 LBS | SYSTOLIC BLOOD PRESSURE: 167 MMHG | OXYGEN SATURATION: 98 % | RESPIRATION RATE: 18 BRPM | HEIGHT: 69 IN | HEART RATE: 87 BPM

## 2023-02-19 VITALS — HEART RATE: 97 BPM | DIASTOLIC BLOOD PRESSURE: 72 MMHG | TEMPERATURE: 100 F | SYSTOLIC BLOOD PRESSURE: 128 MMHG

## 2023-02-19 LAB
ALBUMIN SERPL ELPH-MCNC: 3.5 G/DL — SIGNIFICANT CHANGE UP (ref 3.3–5)
ALBUMIN SERPL ELPH-MCNC: 4.1 G/DL — SIGNIFICANT CHANGE UP (ref 3.3–5)
ALP SERPL-CCNC: 89 U/L — SIGNIFICANT CHANGE UP (ref 40–120)
ALP SERPL-CCNC: SIGNIFICANT CHANGE UP (ref 40–120)
ALT FLD-CCNC: 29 U/L — SIGNIFICANT CHANGE UP (ref 10–45)
ALT FLD-CCNC: SIGNIFICANT CHANGE UP (ref 10–45)
ANION GAP SERPL CALC-SCNC: 10 MMOL/L — SIGNIFICANT CHANGE UP (ref 5–17)
ANION GAP SERPL CALC-SCNC: 7 MMOL/L — SIGNIFICANT CHANGE UP (ref 5–17)
APPEARANCE UR: CLEAR — SIGNIFICANT CHANGE UP
AST SERPL-CCNC: 31 U/L — SIGNIFICANT CHANGE UP (ref 10–40)
AST SERPL-CCNC: SIGNIFICANT CHANGE UP (ref 10–40)
BASOPHILS # BLD AUTO: 0.04 K/UL — SIGNIFICANT CHANGE UP (ref 0–0.2)
BASOPHILS NFR BLD AUTO: 0.3 % — SIGNIFICANT CHANGE UP (ref 0–2)
BILIRUB SERPL-MCNC: 0.4 MG/DL — SIGNIFICANT CHANGE UP (ref 0.2–1.2)
BILIRUB SERPL-MCNC: 0.5 MG/DL — SIGNIFICANT CHANGE UP (ref 0.2–1.2)
BILIRUB UR-MCNC: NEGATIVE — SIGNIFICANT CHANGE UP
BUN SERPL-MCNC: 9 MG/DL — SIGNIFICANT CHANGE UP (ref 7–23)
BUN SERPL-MCNC: 9 MG/DL — SIGNIFICANT CHANGE UP (ref 7–23)
CALCIUM SERPL-MCNC: 8.5 MG/DL — SIGNIFICANT CHANGE UP (ref 8.4–10.5)
CALCIUM SERPL-MCNC: 9.5 MG/DL — SIGNIFICANT CHANGE UP (ref 8.4–10.5)
CHLORIDE SERPL-SCNC: 101 MMOL/L — SIGNIFICANT CHANGE UP (ref 96–108)
CHLORIDE SERPL-SCNC: 97 MMOL/L — SIGNIFICANT CHANGE UP (ref 96–108)
CO2 SERPL-SCNC: 31 MMOL/L — SIGNIFICANT CHANGE UP (ref 22–31)
CO2 SERPL-SCNC: 31 MMOL/L — SIGNIFICANT CHANGE UP (ref 22–31)
COLOR SPEC: YELLOW — SIGNIFICANT CHANGE UP
CREAT SERPL-MCNC: 0.66 MG/DL — SIGNIFICANT CHANGE UP (ref 0.5–1.3)
CREAT SERPL-MCNC: 0.69 MG/DL — SIGNIFICANT CHANGE UP (ref 0.5–1.3)
DIFF PNL FLD: NEGATIVE — SIGNIFICANT CHANGE UP
EGFR: 112 ML/MIN/1.73M2 — SIGNIFICANT CHANGE UP
EGFR: 114 ML/MIN/1.73M2 — SIGNIFICANT CHANGE UP
EOSINOPHIL # BLD AUTO: 0.22 K/UL — SIGNIFICANT CHANGE UP (ref 0–0.5)
EOSINOPHIL NFR BLD AUTO: 1.7 % — SIGNIFICANT CHANGE UP (ref 0–6)
ETHANOL SERPL-MCNC: <10 MG/DL — SIGNIFICANT CHANGE UP (ref 0–10)
FLUAV AG NPH QL: SIGNIFICANT CHANGE UP
FLUBV AG NPH QL: SIGNIFICANT CHANGE UP
GLUCOSE SERPL-MCNC: 105 MG/DL — HIGH (ref 70–99)
GLUCOSE SERPL-MCNC: 112 MG/DL — HIGH (ref 70–99)
GLUCOSE UR QL: NEGATIVE — SIGNIFICANT CHANGE UP
HCT VFR BLD CALC: 44.1 % — SIGNIFICANT CHANGE UP (ref 39–50)
HGB BLD-MCNC: 13.8 G/DL — SIGNIFICANT CHANGE UP (ref 13–17)
IMM GRANULOCYTES NFR BLD AUTO: 0.4 % — SIGNIFICANT CHANGE UP (ref 0–0.9)
KETONES UR-MCNC: NEGATIVE — SIGNIFICANT CHANGE UP
LACTATE SERPL-SCNC: 1.7 MMOL/L — SIGNIFICANT CHANGE UP (ref 0.5–2)
LEUKOCYTE ESTERASE UR-ACNC: NEGATIVE — SIGNIFICANT CHANGE UP
LIDOCAIN IGE QN: 53 U/L — SIGNIFICANT CHANGE UP (ref 7–60)
LYMPHOCYTES # BLD AUTO: 1.55 K/UL — SIGNIFICANT CHANGE UP (ref 1–3.3)
LYMPHOCYTES # BLD AUTO: 11.9 % — LOW (ref 13–44)
MCHC RBC-ENTMCNC: 28.9 PG — SIGNIFICANT CHANGE UP (ref 27–34)
MCHC RBC-ENTMCNC: 31.3 GM/DL — LOW (ref 32–36)
MCV RBC AUTO: 92.3 FL — SIGNIFICANT CHANGE UP (ref 80–100)
MONOCYTES # BLD AUTO: 1.22 K/UL — HIGH (ref 0–0.9)
MONOCYTES NFR BLD AUTO: 9.4 % — SIGNIFICANT CHANGE UP (ref 2–14)
NEUTROPHILS # BLD AUTO: 9.96 K/UL — HIGH (ref 1.8–7.4)
NEUTROPHILS NFR BLD AUTO: 76.3 % — SIGNIFICANT CHANGE UP (ref 43–77)
NITRITE UR-MCNC: NEGATIVE — SIGNIFICANT CHANGE UP
NRBC # BLD: 0 /100 WBCS — SIGNIFICANT CHANGE UP (ref 0–0)
PH UR: 8 — SIGNIFICANT CHANGE UP (ref 5–8)
PLATELET # BLD AUTO: 235 K/UL — SIGNIFICANT CHANGE UP (ref 150–400)
POTASSIUM SERPL-MCNC: 4.4 MMOL/L — SIGNIFICANT CHANGE UP (ref 3.5–5.3)
POTASSIUM SERPL-MCNC: SIGNIFICANT CHANGE UP (ref 3.5–5.3)
POTASSIUM SERPL-SCNC: 4.4 MMOL/L — SIGNIFICANT CHANGE UP (ref 3.5–5.3)
POTASSIUM SERPL-SCNC: SIGNIFICANT CHANGE UP (ref 3.5–5.3)
PROT SERPL-MCNC: 6.4 G/DL — SIGNIFICANT CHANGE UP (ref 6–8.3)
PROT SERPL-MCNC: 7.2 G/DL — SIGNIFICANT CHANGE UP (ref 6–8.3)
PROT UR-MCNC: NEGATIVE MG/DL — SIGNIFICANT CHANGE UP
RBC # BLD: 4.78 M/UL — SIGNIFICANT CHANGE UP (ref 4.2–5.8)
RBC # FLD: 14.6 % — HIGH (ref 10.3–14.5)
RSV RNA NPH QL NAA+NON-PROBE: SIGNIFICANT CHANGE UP
SARS-COV-2 RNA SPEC QL NAA+PROBE: SIGNIFICANT CHANGE UP
SODIUM SERPL-SCNC: 138 MMOL/L — SIGNIFICANT CHANGE UP (ref 135–145)
SODIUM SERPL-SCNC: 139 MMOL/L — SIGNIFICANT CHANGE UP (ref 135–145)
SP GR SPEC: 1.02 — SIGNIFICANT CHANGE UP (ref 1–1.03)
UROBILINOGEN FLD QL: 1 E.U./DL — SIGNIFICANT CHANGE UP
WBC # BLD: 13.04 K/UL — HIGH (ref 3.8–10.5)
WBC # FLD AUTO: 13.04 K/UL — HIGH (ref 3.8–10.5)

## 2023-02-19 PROCEDURE — 96375 TX/PRO/DX INJ NEW DRUG ADDON: CPT

## 2023-02-19 PROCEDURE — 80307 DRUG TEST PRSMV CHEM ANLYZR: CPT

## 2023-02-19 PROCEDURE — 87040 BLOOD CULTURE FOR BACTERIA: CPT

## 2023-02-19 PROCEDURE — 99284 EMERGENCY DEPT VISIT MOD MDM: CPT

## 2023-02-19 PROCEDURE — 85025 COMPLETE CBC W/AUTO DIFF WBC: CPT

## 2023-02-19 PROCEDURE — 74177 CT ABD & PELVIS W/CONTRAST: CPT | Mod: 26,MA

## 2023-02-19 PROCEDURE — 96374 THER/PROPH/DIAG INJ IV PUSH: CPT | Mod: XU

## 2023-02-19 PROCEDURE — 80053 COMPREHEN METABOLIC PANEL: CPT

## 2023-02-19 PROCEDURE — 74177 CT ABD & PELVIS W/CONTRAST: CPT | Mod: MA

## 2023-02-19 PROCEDURE — 87637 SARSCOV2&INF A&B&RSV AMP PRB: CPT

## 2023-02-19 PROCEDURE — 99284 EMERGENCY DEPT VISIT MOD MDM: CPT | Mod: 25

## 2023-02-19 PROCEDURE — 83690 ASSAY OF LIPASE: CPT

## 2023-02-19 PROCEDURE — 81003 URINALYSIS AUTO W/O SCOPE: CPT

## 2023-02-19 PROCEDURE — 36415 COLL VENOUS BLD VENIPUNCTURE: CPT

## 2023-02-19 PROCEDURE — 83605 ASSAY OF LACTIC ACID: CPT

## 2023-02-19 RX ORDER — SODIUM CHLORIDE 9 MG/ML
1000 INJECTION INTRAMUSCULAR; INTRAVENOUS; SUBCUTANEOUS ONCE
Refills: 0 | Status: COMPLETED | OUTPATIENT
Start: 2023-02-19 | End: 2023-02-19

## 2023-02-19 RX ORDER — ACETAMINOPHEN 500 MG
1000 TABLET ORAL ONCE
Refills: 0 | Status: COMPLETED | OUTPATIENT
Start: 2023-02-19 | End: 2023-02-19

## 2023-02-19 RX ORDER — ONDANSETRON 8 MG/1
4 TABLET, FILM COATED ORAL ONCE
Refills: 0 | Status: COMPLETED | OUTPATIENT
Start: 2023-02-19 | End: 2023-02-19

## 2023-02-19 RX ADMIN — SODIUM CHLORIDE 1000 MILLILITER(S): 9 INJECTION INTRAMUSCULAR; INTRAVENOUS; SUBCUTANEOUS at 16:39

## 2023-02-19 RX ADMIN — Medication 400 MILLIGRAM(S): at 17:19

## 2023-02-19 RX ADMIN — SODIUM CHLORIDE 1000 MILLILITER(S): 9 INJECTION INTRAMUSCULAR; INTRAVENOUS; SUBCUTANEOUS at 17:19

## 2023-02-19 RX ADMIN — ONDANSETRON 4 MILLIGRAM(S): 8 TABLET, FILM COATED ORAL at 16:39

## 2023-02-19 NOTE — ED PROVIDER NOTE - PATIENT PORTAL LINK FT
You can access the FollowMyHealth Patient Portal offered by [facility] by registering at the following website: [email or web address]. By joining [employer]’s FollowMyHealth portal, you will also be able to view your health information using other applications (apps) compatible with our system.

## 2023-02-19 NOTE — ED PROVIDER NOTE - CLINICAL SUMMARY MEDICAL DECISION MAKING FREE TEXT BOX
51 yo F with EtOH abuse, benzo use presenting with chills, n/v/abd cramping. Abd soft and nontender here, low grade oral temp. Will check labs, IV hydration, antiemetic, reassess. No indication for imaging at this time given reassuring abdominal exam.

## 2023-02-19 NOTE — ED PROVIDER NOTE - OBJECTIVE STATEMENT
52-year-old male with history of PSA (benzos, EtOH) presenting with chills, nausea, vomiting, abdominal cramping x1 day.  Patient states he feels like he is withdrawing from alcohol.  States he drank half a pint yesterday, also uses benzos as needed.  Denies chest pain or shortness of breath, no abdominal pain currently, no vomiting currently but feels nauseous, no diarrhea, no melena or bright blood per rectum.  Denies other illicit substances.  ROS as above.  With

## 2023-02-19 NOTE — ED ADULT NURSE NOTE - CHPI ED NUR SYMPTOMS NEG
no blurred vision/no change in level of consciousness/no confusion/no loss of consciousness/no numbness/no vomiting

## 2023-02-19 NOTE — ED PROVIDER NOTE - NS ED ROS FT
Constitutional: No fever, + chills  Eyes: No discharge or drainage  Ears, Nose, Mouth, Throat: No nasal discharge, no sore throat  Cardiovascular: No chest pain, no palpitations  Respiratory: No shortness of breath, no cough  Gastrointestinal: + nausea, vomiting, + abdominal pain, no diarrhea or constipation  Musculoskeletal: No joint pain, no swelling  Skin: No rashes or lesions  Neurological: No numbness, weakness, tingling, no headache  Psychiatric: No depression

## 2023-02-19 NOTE — ED ADULT TRIAGE NOTE - CHIEF COMPLAINT QUOTE
pt c/o vomiting , chills , muscle cramps and headaches since , pt drinks alcohol daily , had half pint of vodka yesterday , usually drinks 1 to 2 pints vodka a day , pt feels that he is going into withdrawal

## 2023-02-19 NOTE — ED PROVIDER NOTE - NSFOLLOWUPINSTRUCTIONS_ED_ALL_ED_FT
Please return for any worsening symptoms, fever, worsening pain. You are leaving the hospital against medical advise. Please follow up closely with your primary physician.

## 2023-02-19 NOTE — ED ADULT NURSE NOTE - NSIMPLEMENTINTERV_GEN_ALL_ED
Implemented All Universal Safety Interventions:  [place] to call system. Call bell, personal items and telephone within reach. Instruct patient to call for assistance. Room bathroom lighting operational. Non-slip footwear when patient is off stretcher. Physically safe environment: no spills, clutter or unnecessary equipment. Stretcher in lowest position, wheels locked, appropriate side rails in place.

## 2023-02-19 NOTE — ED PROVIDER NOTE - PHYSICAL EXAMINATION
General: Anxious appearing  HEENT: Normocephalic, atraumatic, extraocular movements intact  CV: Regular rate  Pulm: No respiratory distress, no tachypnea  Abd: Flat, no gross distension, soft, nontender  Ext: warm and well perfused  Skin: No gross rashes or lesions  Neuro: Alert and oriented, moving all extremities, no tongue fasiculations, no hand tremors

## 2023-02-19 NOTE — ED ADULT NURSE REASSESSMENT NOTE - NS ED NURSE REASSESS COMMENT FT1
Pt  wants to leave. Risk and benefits explained. Pt verbalized understanding. Patient has capacity to decide for self. MD Chang made aware. Pt  wants to leave. Risk and benefits explained. Pt verbalized understanding. Patient has capacity to decide for self. MD Cross made aware.

## 2023-02-19 NOTE — ED ADULT TRIAGE NOTE - HEART RATE (BEATS/MIN)
What Type Of Note Output Would You Prefer (Optional)?: Standard Output How Severe Is Your Acne?: moderate Is This A New Presentation, Or A Follow-Up?: Follow Up Acne 87

## 2023-02-19 NOTE — ED ADULT NURSE NOTE - OBJECTIVE STATEMENT
Pt with pmx of HCV, PTSD, bipolar disorder, polysubstance abuse (opioid use - on methadone 120 mg daily, benzodiazepines, cocaine, amphetamines, ETOH) here with alcohol withdrawal symptoms. Last drink yesterday. Patient denies chest pain, discomfort, shortness of breath, difficulty breathing and any form of distress not noted. Patient oriented to ED area. All needs attended. Pt on cardiac monitor. Rounding in progress. Fall risk precautions maintained.

## 2023-02-20 NOTE — ED PROVIDER NOTE - PROGRESS NOTE DETAILS
Patient rectally febrile, will order for blood cx, lactate, COVID/FLU/RSV ordered, UA added. Will obtain CT, pt with leukocytosis as well. Patient stating he would like to leave. Does not want to wait for CT results and rest of workup. Discussed with patient that he may have findings on the CT scan that may warrant further intervention. Patient states he does not care and is leaving, pacing around room. Discussed that this would be leaving AGAINST MEDICAL ADVISE. Patient verbalized understanding of risks of leaving AMA, including worsening symptoms, sepsis and death. Patient will sign out AMA. no

## 2023-02-21 DIAGNOSIS — R10.9 UNSPECIFIED ABDOMINAL PAIN: ICD-10-CM

## 2023-02-21 DIAGNOSIS — Z86.59 PERSONAL HISTORY OF OTHER MENTAL AND BEHAVIORAL DISORDERS: ICD-10-CM

## 2023-02-21 DIAGNOSIS — Z20.822 CONTACT WITH AND (SUSPECTED) EXPOSURE TO COVID-19: ICD-10-CM

## 2023-02-21 DIAGNOSIS — D72.829 ELEVATED WHITE BLOOD CELL COUNT, UNSPECIFIED: ICD-10-CM

## 2023-02-21 DIAGNOSIS — R11.2 NAUSEA WITH VOMITING, UNSPECIFIED: ICD-10-CM

## 2023-02-21 DIAGNOSIS — Z53.29 PROCEDURE AND TREATMENT NOT CARRIED OUT BECAUSE OF PATIENT'S DECISION FOR OTHER REASONS: ICD-10-CM

## 2023-02-21 DIAGNOSIS — Z86.19 PERSONAL HISTORY OF OTHER INFECTIOUS AND PARASITIC DISEASES: ICD-10-CM

## 2023-02-21 DIAGNOSIS — R68.83 CHILLS (WITHOUT FEVER): ICD-10-CM

## 2023-02-21 DIAGNOSIS — I10 ESSENTIAL (PRIMARY) HYPERTENSION: ICD-10-CM

## 2023-02-21 DIAGNOSIS — Z86.74 PERSONAL HISTORY OF SUDDEN CARDIAC ARREST: ICD-10-CM

## 2023-08-02 NOTE — ED ADULT NURSE NOTE - IN THE PAST 12 MONTHS HAVE YOU USED DRUGS OTHER THAN THOSE REQUIRED FOR MEDICAL REASON?
[facility] [address] Urology Consultation / New Patient Visit    Patient:  Jayce Brock  YOB: 1960  Date: 8/2/2023    HISTORY OF PRESENT ILLNESS:   The patient is a 65 y.o. female who presents today for evaluation of the following problems:   Kidney calculus:  Patient is here today for a kidney calculus which was first noted on 6/21/23. Location: Left lower pole  Size: 29 mm  Current medical Rx for renal calculus: none  Passed recent calculus? No  Stone composition: unknown  Flank pain? no  Hematuria? microscopic   Lower urinary tract symptoms: urgency, frequency, and nocturia, 2 times per night   Today's AUA Symptom Score (QOL): 6 (2)  (Patient's old records have been requested, reviewed and summarized in today's note: 6/23/23 office note of  Dr. Jaden Vinson)    Summary of old records:   Left lower pole kidney stone noted on 6/21/23 renal US (2.9 cm)  Microhematuria   Stage 3b CKD (Yamel Dux)    Procedures Today: N/A    Urinalysis today:  Results for POC orders placed in visit on 08/02/23   POCT Urinalysis No Micro (Auto)   Result Value Ref Range    Color, UA yellow     Clarity, UA clear     Glucose, UA POC neg     Bilirubin, UA      Ketones, UA      Spec Grav, UA      Blood, UA POC small     pH, UA      Protein, UA POC > = 300 mg     Urobilinogen, UA      Leukocytes, UA neg     Nitrite, UA neg        Last BUN and creatinine:  Lab Results   Component Value Date    BUN 40 (H) 07/07/2023     Lab Results   Component Value Date    CREATININE 2.21 (H) 07/07/2023       Last CBC:  Lab Results   Component Value Date    WBC 7.8 06/21/2023    HGB 11.2 (L) 06/21/2023    HCT 37.2 06/21/2023    MCV 75.0 (L) 06/21/2023     06/21/2023       Additional Lab/Culture results:   Component    Specimen Description . CLEAN CATCH URINE    Culture NO SIGNIFICANT GROWTH    Resulting Agency [address] [facility]              Specimen Collected: 07/07/23 10:41 EDT Last No

## 2023-08-26 ENCOUNTER — EMERGENCY (EMERGENCY)
Facility: HOSPITAL | Age: 53
LOS: 1 days | Discharge: HOME | End: 2023-08-26
Admitting: EMERGENCY MEDICINE
Payer: MEDICAID

## 2023-08-26 VITALS
TEMPERATURE: 98 F | HEART RATE: 52 BPM | SYSTOLIC BLOOD PRESSURE: 119 MMHG | OXYGEN SATURATION: 96 % | DIASTOLIC BLOOD PRESSURE: 58 MMHG | RESPIRATION RATE: 18 BRPM

## 2023-08-26 LAB
ALBUMIN SERPL ELPH-MCNC: 3.5 G/DL — SIGNIFICANT CHANGE UP (ref 3.4–5)
ALP SERPL-CCNC: 115 U/L — SIGNIFICANT CHANGE UP (ref 40–120)
ALT FLD-CCNC: 29 U/L — SIGNIFICANT CHANGE UP (ref 12–42)
ANION GAP SERPL CALC-SCNC: 6 MMOL/L — LOW (ref 9–16)
AST SERPL-CCNC: 31 U/L — SIGNIFICANT CHANGE UP (ref 15–37)
BASOPHILS # BLD AUTO: 0.04 K/UL — SIGNIFICANT CHANGE UP (ref 0–0.2)
BASOPHILS NFR BLD AUTO: 0.3 % — SIGNIFICANT CHANGE UP (ref 0–2)
BILIRUB SERPL-MCNC: 0.6 MG/DL — SIGNIFICANT CHANGE UP (ref 0.2–1.2)
BUN SERPL-MCNC: 12 MG/DL — SIGNIFICANT CHANGE UP (ref 7–23)
CALCIUM SERPL-MCNC: 9 MG/DL — SIGNIFICANT CHANGE UP (ref 8.5–10.5)
CHLORIDE SERPL-SCNC: 100 MMOL/L — SIGNIFICANT CHANGE UP (ref 96–108)
CO2 SERPL-SCNC: 34 MMOL/L — HIGH (ref 22–31)
CREAT SERPL-MCNC: 0.66 MG/DL — SIGNIFICANT CHANGE UP (ref 0.5–1.3)
EGFR: 113 ML/MIN/1.73M2 — SIGNIFICANT CHANGE UP
EOSINOPHIL # BLD AUTO: 0.18 K/UL — SIGNIFICANT CHANGE UP (ref 0–0.5)
EOSINOPHIL NFR BLD AUTO: 1.5 % — SIGNIFICANT CHANGE UP (ref 0–6)
GLUCOSE SERPL-MCNC: 90 MG/DL — SIGNIFICANT CHANGE UP (ref 70–99)
HCT VFR BLD CALC: 41.6 % — SIGNIFICANT CHANGE UP (ref 39–50)
HGB BLD-MCNC: 13.2 G/DL — SIGNIFICANT CHANGE UP (ref 13–17)
HIV 1 & 2 AB SERPL IA.RAPID: SIGNIFICANT CHANGE UP
IMM GRANULOCYTES NFR BLD AUTO: 0.2 % — SIGNIFICANT CHANGE UP (ref 0–0.9)
LYMPHOCYTES # BLD AUTO: 18.5 % — SIGNIFICANT CHANGE UP (ref 13–44)
LYMPHOCYTES # BLD AUTO: 2.24 K/UL — SIGNIFICANT CHANGE UP (ref 1–3.3)
MCHC RBC-ENTMCNC: 28.7 PG — SIGNIFICANT CHANGE UP (ref 27–34)
MCHC RBC-ENTMCNC: 31.7 GM/DL — LOW (ref 32–36)
MCV RBC AUTO: 90.4 FL — SIGNIFICANT CHANGE UP (ref 80–100)
MONOCYTES # BLD AUTO: 0.77 K/UL — SIGNIFICANT CHANGE UP (ref 0–0.9)
MONOCYTES NFR BLD AUTO: 6.4 % — SIGNIFICANT CHANGE UP (ref 2–14)
NEUTROPHILS # BLD AUTO: 8.84 K/UL — HIGH (ref 1.8–7.4)
NEUTROPHILS NFR BLD AUTO: 73.1 % — SIGNIFICANT CHANGE UP (ref 43–77)
NRBC # BLD: 0 /100 WBCS — SIGNIFICANT CHANGE UP (ref 0–0)
PLATELET # BLD AUTO: 213 K/UL — SIGNIFICANT CHANGE UP (ref 150–400)
POTASSIUM SERPL-MCNC: 3.7 MMOL/L — SIGNIFICANT CHANGE UP (ref 3.5–5.3)
POTASSIUM SERPL-SCNC: 3.7 MMOL/L — SIGNIFICANT CHANGE UP (ref 3.5–5.3)
PROT SERPL-MCNC: 7.9 G/DL — SIGNIFICANT CHANGE UP (ref 6.4–8.2)
RBC # BLD: 4.6 M/UL — SIGNIFICANT CHANGE UP (ref 4.2–5.8)
RBC # FLD: 13.4 % — SIGNIFICANT CHANGE UP (ref 10.3–14.5)
SODIUM SERPL-SCNC: 140 MMOL/L — SIGNIFICANT CHANGE UP (ref 132–145)
TROPONIN I, HIGH SENSITIVITY RESULT: 4.9 NG/L — SIGNIFICANT CHANGE UP
WBC # BLD: 12.1 K/UL — HIGH (ref 3.8–10.5)
WBC # FLD AUTO: 12.1 K/UL — HIGH (ref 3.8–10.5)

## 2023-08-26 PROCEDURE — 71045 X-RAY EXAM CHEST 1 VIEW: CPT | Mod: 26

## 2023-08-26 PROCEDURE — 99285 EMERGENCY DEPT VISIT HI MDM: CPT

## 2023-08-26 RX ORDER — ACETAMINOPHEN 500 MG
650 TABLET ORAL ONCE
Refills: 0 | Status: COMPLETED | OUTPATIENT
Start: 2023-08-26 | End: 2023-08-26
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
## 2023-08-26 NOTE — ED ADULT NURSE NOTE - NSFALLUNIVINTERV_ED_ALL_ED
Bed/Stretcher in lowest position, wheels locked, appropriate side rails in place/Call bell, personal items and telephone in reach/Instruct patient to call for assistance before getting out of bed/chair/stretcher/Non-slip footwear applied when patient is off stretcher/[place] to call system/Physically safe environment - no spills, clutter or unnecessary equipment/Purposeful proactive rounding/Room/bathroom lighting operational, light cord in reach

## 2023-08-26 NOTE — ED PROVIDER NOTE - PATIENT PORTAL LINK FT
You can access the FollowMyHealth Patient Portal offered by [facility] by registering at the following website: [email or web address]. By joining [employer]’s FollowMyHealth portal, you will also be able to view your health information using other applications (apps) compatible with our system.

## 2023-08-26 NOTE — ED PROVIDER NOTE - CLINICAL SUMMARY MEDICAL DECISION MAKING FREE TEXT BOX
Multiple complaints -under arrest. No focal complaint.   Exam unremarkable   Plan: CXR /labs reassess  dc/ with return precautions

## 2023-08-26 NOTE — ED ADULT NURSE REASSESSMENT NOTE - NS ED NURSE REASSESS COMMENT FT1
Pt is a hard stick and states usually doppler/us is used for iv placement and blood draw. Pt is a hard stick and states usually doppler/us is used for iv placement and blood draw. PA informed and is aware.
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
## 2023-08-26 NOTE — ED ADULT NURSE NOTE - CCCP TRG CHIEF CMPLNT
Oncology Nursing Communication Tool 7:34 PM 
1/18/2019 Bedside shift change report given to [facility], RN (incoming nurse) by Tatiana Villeda RN (outgoing nurse) on [facility]. Report included the following information SBAR. Shift Summary:  
  
 Issues for physician to address: Oncology Shift Note Admission Date 1/16/2019 Admission Diagnosis Sepsis ([address] Pneumonia Code Status Full Code Consults IP CONSULT TO HOSPITALIST 
IP CONSULT TO NEPHROLOGY Cardiac Monitoring [] Yes [] No  
  
Purposeful Hourly Rounding [] Yes   
Easton Score Total Score: 1 Easton score 3 or > [] Bed Alarm [] Avasys [] 1:1 sitter [] Patient refused (Place signed refusal form in chart) Pain Managed [] Yes [] No  
 Key Pain Meds The patient is on no pain meds. Influenza Vaccine Received Flu Vaccine for Current Season (usually Sept-March): Yes Oxygen needs? [] Room air Oxygen @  []1L    []2L    []3L   []4L    []5L   []6L Use home O2? [] Yes [] No 
Perform O2 challenge test using  smartphrase (.oxygenchallenge) Last bowel movement Last Bowel Movement Date: 01/17/19 
bowel movement Urinary Catheter LDAs Triple Lumen 01/18/19 Right Femoral (Active) Central Line Being Utilized Yes 1/18/2019  4:10 PM  
Criteria for Appropriate Use Limited/no vessel suitable for conventional peripheral access 1/18/2019  4:10 PM  
Site Assessment Clean, dry, & intact 1/18/2019  4:10 PM  
Infiltration Assessment 0 1/18/2019  4:10 PM  
Date of Last Dressing Change 01/18/19 1/18/2019  4:10 PM  
Dressing Status Clean, dry, & intact 1/18/2019  4:10 PM  
Dressing Type Tape 1/18/2019  4:10 PM  
Action Taken Blood drawn;Open ports on tubing capped 1/18/2019  4:10 PM  
Positive Blood Return (Medial Site) Yes 1/18/2019  4:10 PM  
Medial Hub Color/Line Status Brown 1/18/2019  4:10 PM  
Alcohol Cap Used Yes 1/18/2019  4:10 PM  
    
 Peripheral IV 01/17/19 Left;Posterior Wrist (Active) Site Assessment Clean, dry, & intact 1/18/2019  4:10 PM  
Phlebitis Assessment 0 1/18/2019  4:10 PM  
Infiltration Assessment 0 1/18/2019  4:10 PM  
Dressing Status Clean, dry, & intact 1/18/2019  4:10 PM  
Dressing Type Transparent 1/18/2019  4:10 PM  
Hub Color/Line Status Yellow; Flushed; Infusing 1/18/2019  1:55 PM  
                  
  
Readmission Risk Assessment Tool Score Medium Risk 25 Total Score 4 IP Visits Last 12 Months (1-3=4, 4=9, >4=11) 9 Pt. Coverage (Medicare=5 , Medicaid, or Self-Pay=4) 5 Charlson Comorbidity Score (Age + Comorbid Conditions) Criteria that do not apply:  
 Has Seen PCP in Last 6 Months (Yes=3, No=0) . Living with Significant Other. Assisted Living. LTAC. SNF. or  
Rehab Patient Length of Stay (>5 days = 3) Expected Length of Stay 4d 19h Actual Length of Stay 2 Tevin Marroquin RN 
 medical evaluation

## 2023-08-26 NOTE — ED PROVIDER NOTE - OBJECTIVE STATEMENT
54 y/o male here with multiple medical complaints under arrest with no focal acute complaint. Endorsing fatigue. Appearing well NAD stable. Denies nausea,vomiting,diarrhea,fevers,chills.

## 2023-08-30 DIAGNOSIS — Z00.00 ENCOUNTER FOR GENERAL ADULT MEDICAL EXAMINATION WITHOUT ABNORMAL FINDINGS: ICD-10-CM

## 2023-08-30 DIAGNOSIS — Z86.59 PERSONAL HISTORY OF OTHER MENTAL AND BEHAVIORAL DISORDERS: ICD-10-CM

## 2023-08-30 DIAGNOSIS — I10 ESSENTIAL (PRIMARY) HYPERTENSION: ICD-10-CM

## 2023-08-30 DIAGNOSIS — Z87.74 PERSONAL HISTORY OF (CORRECTED) CONGENITAL MALFORMATIONS OF HEART AND CIRCULATORY SYSTEM: ICD-10-CM

## 2023-08-30 DIAGNOSIS — Z86.19 PERSONAL HISTORY OF OTHER INFECTIOUS AND PARASITIC DISEASES: ICD-10-CM

## 2023-10-01 ENCOUNTER — EMERGENCY (EMERGENCY)
Facility: HOSPITAL | Age: 53
LOS: 1 days | Discharge: HOME | End: 2023-10-01
Attending: EMERGENCY MEDICINE | Admitting: EMERGENCY MEDICINE
Payer: MEDICAID

## 2023-10-01 VITALS
RESPIRATION RATE: 20 BRPM | HEART RATE: 79 BPM | DIASTOLIC BLOOD PRESSURE: 75 MMHG | TEMPERATURE: 100 F | OXYGEN SATURATION: 99 % | SYSTOLIC BLOOD PRESSURE: 121 MMHG

## 2023-10-01 VITALS
RESPIRATION RATE: 16 BRPM | HEART RATE: 87 BPM | SYSTOLIC BLOOD PRESSURE: 122 MMHG | DIASTOLIC BLOOD PRESSURE: 76 MMHG | TEMPERATURE: 98 F | OXYGEN SATURATION: 99 %

## 2023-10-01 LAB
ALBUMIN SERPL ELPH-MCNC: 3.2 G/DL — LOW (ref 3.3–5)
ALP SERPL-CCNC: 111 U/L — SIGNIFICANT CHANGE UP (ref 40–120)
ALT FLD-CCNC: 31 U/L — SIGNIFICANT CHANGE UP (ref 10–45)
ANION GAP SERPL CALC-SCNC: 7 MMOL/L — SIGNIFICANT CHANGE UP (ref 5–17)
ANISOCYTOSIS BLD QL: SLIGHT — SIGNIFICANT CHANGE UP
AST SERPL-CCNC: 32 U/L — SIGNIFICANT CHANGE UP (ref 10–40)
BASOPHILS # BLD AUTO: 0.12 K/UL — SIGNIFICANT CHANGE UP (ref 0–0.2)
BASOPHILS NFR BLD AUTO: 0.9 % — SIGNIFICANT CHANGE UP (ref 0–2)
BILIRUB SERPL-MCNC: 1.3 MG/DL — HIGH (ref 0.2–1.2)
BUN SERPL-MCNC: 8 MG/DL — SIGNIFICANT CHANGE UP (ref 7–23)
CALCIUM SERPL-MCNC: 9.1 MG/DL — SIGNIFICANT CHANGE UP (ref 8.4–10.5)
CHLORIDE SERPL-SCNC: 98 MMOL/L — SIGNIFICANT CHANGE UP (ref 96–108)
CO2 SERPL-SCNC: 30 MMOL/L — SIGNIFICANT CHANGE UP (ref 22–31)
CREAT SERPL-MCNC: 0.66 MG/DL — SIGNIFICANT CHANGE UP (ref 0.5–1.3)
EGFR: 113 ML/MIN/1.73M2 — SIGNIFICANT CHANGE UP
EOSINOPHIL # BLD AUTO: 0 K/UL — SIGNIFICANT CHANGE UP (ref 0–0.5)
EOSINOPHIL NFR BLD AUTO: 0 % — SIGNIFICANT CHANGE UP (ref 0–6)
FLUAV AG NPH QL: SIGNIFICANT CHANGE UP
FLUBV AG NPH QL: SIGNIFICANT CHANGE UP
GLUCOSE SERPL-MCNC: 122 MG/DL — HIGH (ref 70–99)
HCT VFR BLD CALC: 40 % — SIGNIFICANT CHANGE UP (ref 39–50)
HGB BLD-MCNC: 12.6 G/DL — LOW (ref 13–17)
HYPOCHROMIA BLD QL: SLIGHT — SIGNIFICANT CHANGE UP
LIDOCAIN IGE QN: 9 U/L — SIGNIFICANT CHANGE UP (ref 7–60)
LYMPHOCYTES # BLD AUTO: 1.82 K/UL — SIGNIFICANT CHANGE UP (ref 1–3.3)
LYMPHOCYTES # BLD AUTO: 13.3 % — SIGNIFICANT CHANGE UP (ref 13–44)
MAGNESIUM SERPL-MCNC: 2 MG/DL — SIGNIFICANT CHANGE UP (ref 1.6–2.6)
MANUAL SMEAR VERIFICATION: SIGNIFICANT CHANGE UP
MCHC RBC-ENTMCNC: 28.1 PG — SIGNIFICANT CHANGE UP (ref 27–34)
MCHC RBC-ENTMCNC: 31.5 GM/DL — LOW (ref 32–36)
MCV RBC AUTO: 89.1 FL — SIGNIFICANT CHANGE UP (ref 80–100)
METAMYELOCYTES # FLD: 0.9 % — HIGH (ref 0–0)
MONOCYTES # BLD AUTO: 1.92 K/UL — HIGH (ref 0–0.9)
MONOCYTES NFR BLD AUTO: 14.1 % — HIGH (ref 2–14)
NEUTROPHILS # BLD AUTO: 8.57 K/UL — HIGH (ref 1.8–7.4)
NEUTROPHILS NFR BLD AUTO: 62.8 % — SIGNIFICANT CHANGE UP (ref 43–77)
OVALOCYTES BLD QL SMEAR: SLIGHT — SIGNIFICANT CHANGE UP
PLAT MORPH BLD: ABNORMAL
PLATELET # BLD AUTO: 187 K/UL — SIGNIFICANT CHANGE UP (ref 150–400)
POIKILOCYTOSIS BLD QL AUTO: SLIGHT — SIGNIFICANT CHANGE UP
POTASSIUM SERPL-MCNC: 4.2 MMOL/L — SIGNIFICANT CHANGE UP (ref 3.5–5.3)
POTASSIUM SERPL-SCNC: 4.2 MMOL/L — SIGNIFICANT CHANGE UP (ref 3.5–5.3)
PROT SERPL-MCNC: 7.5 G/DL — SIGNIFICANT CHANGE UP (ref 6–8.3)
RBC # BLD: 4.49 M/UL — SIGNIFICANT CHANGE UP (ref 4.2–5.8)
RBC # FLD: 14.8 % — HIGH (ref 10.3–14.5)
RBC BLD AUTO: ABNORMAL
RSV RNA NPH QL NAA+NON-PROBE: SIGNIFICANT CHANGE UP
SARS-COV-2 RNA SPEC QL NAA+PROBE: SIGNIFICANT CHANGE UP
SMUDGE CELLS # BLD: PRESENT — SIGNIFICANT CHANGE UP
SODIUM SERPL-SCNC: 135 MMOL/L — SIGNIFICANT CHANGE UP (ref 135–145)
TROPONIN T, HIGH SENSITIVITY RESULT: 9 NG/L — SIGNIFICANT CHANGE UP (ref 0–51)
VARIANT LYMPHS # BLD: 8 % — HIGH (ref 0–6)
WBC # BLD: 13.65 K/UL — HIGH (ref 3.8–10.5)
WBC # FLD AUTO: 13.65 K/UL — HIGH (ref 3.8–10.5)

## 2023-10-01 PROCEDURE — 99285 EMERGENCY DEPT VISIT HI MDM: CPT | Mod: 25

## 2023-10-01 PROCEDURE — 99284 EMERGENCY DEPT VISIT MOD MDM: CPT | Mod: 25

## 2023-10-01 PROCEDURE — 72125 CT NECK SPINE W/O DYE: CPT | Mod: MA

## 2023-10-01 PROCEDURE — 74177 CT ABD & PELVIS W/CONTRAST: CPT | Mod: 26,MA

## 2023-10-01 PROCEDURE — 70450 CT HEAD/BRAIN W/O DYE: CPT | Mod: MA

## 2023-10-01 PROCEDURE — 96374 THER/PROPH/DIAG INJ IV PUSH: CPT | Mod: XU

## 2023-10-01 PROCEDURE — 73010 X-RAY EXAM OF SHOULDER BLADE: CPT | Mod: 26,LT

## 2023-10-01 PROCEDURE — 71046 X-RAY EXAM CHEST 2 VIEWS: CPT

## 2023-10-01 PROCEDURE — 87637 SARSCOV2&INF A&B&RSV AMP PRB: CPT

## 2023-10-01 PROCEDURE — 72125 CT NECK SPINE W/O DYE: CPT | Mod: 26,MA

## 2023-10-01 PROCEDURE — 74177 CT ABD & PELVIS W/CONTRAST: CPT | Mod: MA

## 2023-10-01 PROCEDURE — 70450 CT HEAD/BRAIN W/O DYE: CPT | Mod: 26,MA

## 2023-10-01 PROCEDURE — 71046 X-RAY EXAM CHEST 2 VIEWS: CPT | Mod: 26

## 2023-10-01 PROCEDURE — 73010 X-RAY EXAM OF SHOULDER BLADE: CPT

## 2023-10-01 PROCEDURE — 85025 COMPLETE CBC W/AUTO DIFF WBC: CPT

## 2023-10-01 PROCEDURE — 83690 ASSAY OF LIPASE: CPT

## 2023-10-01 PROCEDURE — 80053 COMPREHEN METABOLIC PANEL: CPT

## 2023-10-01 PROCEDURE — 83735 ASSAY OF MAGNESIUM: CPT

## 2023-10-01 PROCEDURE — 71260 CT THORAX DX C+: CPT | Mod: 26,MA

## 2023-10-01 PROCEDURE — 36415 COLL VENOUS BLD VENIPUNCTURE: CPT

## 2023-10-01 PROCEDURE — 84484 ASSAY OF TROPONIN QUANT: CPT

## 2023-10-01 PROCEDURE — 71260 CT THORAX DX C+: CPT | Mod: MA

## 2023-10-01 RX ORDER — LIDOCAINE 4 G/100G
1 CREAM TOPICAL
Qty: 7 | Refills: 0
Start: 2023-10-01 | End: 2023-10-07

## 2023-10-01 RX ORDER — ACETAMINOPHEN 500 MG
1000 TABLET ORAL ONCE
Refills: 0 | Status: DISCONTINUED | OUTPATIENT
Start: 2023-10-01 | End: 2023-10-01

## 2023-10-01 RX ORDER — TRAMADOL HYDROCHLORIDE 50 MG/1
1 TABLET ORAL
Qty: 15 | Refills: 0
Start: 2023-10-01

## 2023-10-01 RX ORDER — ACETAMINOPHEN 500 MG
1000 TABLET ORAL ONCE
Refills: 0 | Status: COMPLETED | OUTPATIENT
Start: 2023-10-01 | End: 2023-10-01

## 2023-10-01 RX ADMIN — Medication 400 MILLIGRAM(S): at 09:47

## 2023-10-01 NOTE — ED ADULT NURSE NOTE - SUICIDE SCREENING QUESTION 3
History & Physical  Gynecology      SUBJECTIVE:     Chief Complaint: Gynecologic Exam       History of Present Illness:    Cherelle Cohen is a 34 y.o. female  here  for annual routine Pap and checkup. No LMP recorded. Patient has had an implant.  Pt has a Mirena IUD in place.  Reports that it was placed in 2013.  Still not having menses.  Would like to have it removed and replaced.  She has no unusual complaints.      denies break through bleeding.   denies vaginal itching or irritation.  denies vaginal discharge.    She is not currently sexually active  She uses IUD for contraception.    History of abnormal pap: No  Last Pap:  Pt unsure of year of last pap   Last MMG: No  Last Colonoscopy:  No    OB Hx: history of TAB with D&C in     Review of patient's allergies indicates:   Allergen Reactions    Amoxicillin Itching and Rash       History reviewed. No pertinent past medical history.  History reviewed. No pertinent surgical history.  OB History    None       Family History   Problem Relation Age of Onset    Breast cancer Maternal Grandmother     Breast cancer Maternal Aunt     Colon cancer Neg Hx     Ovarian cancer Neg Hx      Social History     Tobacco Use    Smoking status: Never Smoker    Smokeless tobacco: Never Used   Substance Use Topics    Alcohol use: Not Currently    Drug use: Never       No current outpatient medications on file.     No current facility-administered medications for this visit.          Review of Systems:  Review of Systems   Constitutional: Negative for activity change, appetite change, chills, fatigue, fever and unexpected weight change.   Respiratory: Negative for cough, shortness of breath and wheezing.    Cardiovascular: Negative for chest pain and leg swelling.   Gastrointestinal: Negative for abdominal pain, constipation, diarrhea, nausea and vomiting.   Endocrine: Negative for hair loss and hot flashes.   Genitourinary: Negative for decreased libido,  dyspareunia, dysuria, frequency, menstrual problem, pelvic pain, vaginal bleeding, vaginal discharge and vaginal pain.   Integumentary:  Negative for acne, hair changes, nipple discharge and breast skin changes.   Neurological: Negative for headaches.   Psychiatric/Behavioral: Negative for sleep disturbance.   Breast: Negative for mastodynia, nipple discharge and skin changes       OBJECTIVE:     Physical Exam:  Physical Exam   Constitutional: She is oriented to person, place, and time. She appears well-developed and well-nourished.   HENT:   Head: Normocephalic and atraumatic.   Eyes: Conjunctivae are normal. Right eye exhibits no discharge. Left eye exhibits no discharge. No scleral icterus.   Pulmonary/Chest: Effort normal. No stridor. She exhibits no mass, no tenderness and no bony tenderness. Right breast exhibits no inverted nipple, no mass, no nipple discharge, no skin change and no tenderness. Left breast exhibits no inverted nipple, no mass, no nipple discharge, no skin change and no tenderness. No breast swelling, tenderness, discharge or bleeding. Breasts are symmetrical.   Abdominal: Soft. She exhibits no distension. There is no tenderness.   Genitourinary: Vagina normal and uterus normal. No breast swelling, tenderness, discharge or bleeding. No labial fusion. There is no rash, tenderness, lesion or injury on the right labia. There is no rash, tenderness, lesion or injury on the left labia. Cervix exhibits no motion tenderness, no discharge and no friability. Right adnexum displays no mass, no tenderness and no fullness. Left adnexum displays no mass, no tenderness and no fullness.   Genitourinary Comments: Normal external genitalia.  Normal hair distribution.  Urethral meatus normal. No cervical lesions or masses.  No vaginal bleeding noted.  No adnexal or uterine tenderness.  No palpable adnexal masses.   Musculoskeletal: Normal range of motion.   Neurological: She is alert and oriented to person,  place, and time.   Skin: Skin is warm and dry.   Psychiatric: She has a normal mood and affect. Her behavior is normal. Judgment and thought content normal.         ASSESSMENT:       ICD-10-CM ICD-9-CM    1. Encounter for well woman exam with routine gynecological exam Z01.419 V72.31 Liquid-Based Pap Smear, Screening      HPV High Risk Genotypes, PCR   2. Encounter for initial prescription of intrauterine contraceptive device (IUD) Z30.014 V25.02 Device Authorization Order   3. Screen for STD (sexually transmitted disease) Z11.3 V74.5 C. trachomatis/N. gonorrhoeae by AMP DNA      Vaginosis Screen by DNA Probe      HIV 1/2 Ag/Ab (4th Gen)      RPR      Hepatitis Panel, Acute          Plan:           Cherelle was seen today for gynecologic exam.    Diagnoses and all orders for this visit:    Encounter for well woman exam with routine gynecological exam  -     Liquid-Based Pap Smear, Screening  -     HPV High Risk Genotypes, PCR  - pap and HPV today  - MMG not indicated  - Cscope not indicated    Encounter for initial prescription of intrauterine contraceptive device (IUD)  - Mirena in place for 7 years.  Needs replacement.  Will order.  Not sexually active at this time.  -     Device Authorization Order    Screen for STD (sexually transmitted disease)  -     C. trachomatis/N. gonorrhoeae by AMP DNA  -     Vaginosis Screen by DNA Probe  -     HIV 1/2 Ag/Ab (4th Gen); Future  -     RPR; Future  -     Hepatitis Panel, Acute; Future          Orders Placed This Encounter   Procedures    HPV High Risk Genotypes, PCR    C. trachomatis/N. gonorrhoeae by AMP DNA    Vaginosis Screen by DNA Probe    HIV 1/2 Ag/Ab (4th Gen)    RPR    Hepatitis Panel, Acute    Device Authorization Order       Follow up in about 1 year (around 3/11/2021) for annual.    Counseling time: 30 minutes    Dinorah De Guzman   No

## 2023-10-01 NOTE — ED ADULT TRIAGE NOTE - CHIEF COMPLAINT QUOTE
Patient is an un-domiciled male, presenting to the triage reporting pain to the left side of his body and upper left extremity for one week, fever, and headache. Patient reports generalized and non-specific symptoms ranging from 'burning' sensation cycling to 'pulsating pressure' to "everywhere but mostly on the left" onset one week ago. Patient reports that he has taken xanax for the pain without relief. Previous records cite that the patient is under methadone therapy. Patient noted to be febrile with triage contact. When inquired if the patient had taken any anti-pyretics, patient reports that "the thought of even taking Tylenol makes my stomach so queasy."

## 2023-10-01 NOTE — ED PROVIDER NOTE - CARE PLAN
1 Principal Discharge DX:	Multiple fractures of ribs of left side  Secondary Diagnosis:	Splenomegaly

## 2023-10-01 NOTE — ED PROVIDER NOTE - PATIENT PORTAL LINK FT
You can access the FollowMyHealth Patient Portal offered by [facility] by registering at the following website: [email or web address]. By joining [facility]’s FollowMyHealth portal, you will also be able to view your health information using other applications (apps) compatible with our system.

## 2023-10-01 NOTE — ED ADULT NURSE NOTE - NSFALLRISKINTERV_ED_ALL_ED

## 2023-10-01 NOTE — ED PROVIDER NOTE - NSFOLLOWUPINSTRUCTIONS_ED_ALL_ED_FT
Take tramadol every 8 hours as needed for pain.   Apply lidocaine patch to your left chest wall.  Avoid any heavy lifting/ straining/ coughing/ sneezing.  Follow up at [facility] for re-evaluation.   Return to er for any new or worsening symptoms (high fevers, difficulty breathing, dizziness, passing out, abdominal pain, vomiting...).      Rib Fracture    A rib fracture is a break or crack in one of the bones of the ribs. The ribs are like a cage that goes around your upper chest. A broken or cracked rib is often painful, but most do not cause other problems. Most rib fractures usually heal on their own in 1–3 months.    What are the causes?  Doing movements over and over again with a lot of force, such as pitching a baseball or having a very bad cough.  A direct hit to the chest.  Cancer that has spread to the bones.  What are the signs or symptoms?  Pain when you breathe in or cough.  Pain when someone presses on the injured area.  Feeling short of breath.  How is this treated?  Treatment depends on how bad the fracture is. In general:  Most rib fractures usually heal on their own in 1–3 months.  Healing may take longer if you have a cough or are doing activities that make the injury worse.  While you heal, you may be given medicines to control pain.  You will also be taught deep breathing exercises.  Very bad injuries may require a stay at the hospital or surgery.  Follow these instructions at home:  Managing pain, stiffness, and swelling    If told, put ice on the injured area. To do this:  Put ice in a plastic bag.  Place a towel between your skin and the bag.  Leave the ice on for 20 minutes, 2–3 times a day.  Take off the ice if your skin turns bright red. This is very important. If you cannot feel pain, heat, or cold, you have a greater risk of damage to the area.  Take over-the-counter and prescription medicines only as told by your doctor.  Activity    Avoid activities that cause pain to the injured area. Protect your injured area.  Slowly increase activity as told by your doctor.  General instructions    Do deep breathing exercises as told by your doctor. You may be told to:  Take deep breaths many times a day.  Cough several times a day while hugging a pillow.  Use a device (incentive spirometer) to do deep breathing many times a day.  Drink enough fluid to keep your pee (urine) clear or pale yellow.  Do not wear a rib belt or binder.  Keep all follow-up visits.  Contact a doctor if:  You have a fever.  Get help right away if:  You have trouble breathing.  You are short of breath.  You cannot stop coughing.  You cough up thick or bloody spit.  You feel like you may vomit (nauseous), vomit, or have belly (abdominal) pain.  Your pain gets worse and medicine does not help.  These symptoms may be an emergency. Get help right away. Call your local emergency services (911 in the [place]  Do not wait to see if the symptoms will go away.  Do not drive yourself to the hospital.  Summary  A rib fracture is a break or crack in one of the bones of the ribs.  Apply ice to the injured area and take medicines for pain as told by your doctor.  Take deep breaths and cough several times a day. Hug a pillow every time you cough.  This information is not intended to replace advice given to you by your health care provider. Make sure you discuss any questions you have with your health care provider.

## 2023-10-01 NOTE — ED PROVIDER NOTE - PHYSICAL EXAMINATION
VITAL SIGNS: I have reviewed nursing notes and confirm.  CONSTITUTIONAL: Well appearing, in no acute distress.   SKIN:  warm and dry, no acute rash.   HEAD:  normocephalic, atraumatic.  EYES: EOM intact; conjunctiva and sclera clear.  ENT: No nasal discharge; airway clear. Healing abrasion noted to bridge of nose.   NECK: Supple; non tender.  CARD: S1, S2 normal; no murmurs, gallops, or rubs. Regular rate and rhythm.   RESP:  Clear to auscultation b/l, no wheezes, rales or rhonchi.  ABD: Normal bowel sounds; soft; non-distended; non-tender; no guarding/ rebound.  MSK: Normal ROM. No clubbing, cyanosis or edema. 2+ pulses to b/l ue/le. No CTL spine tenderness. +tenderness to palpation along scapula and left lower lateral rib cage. No ecchymosis or abrasions.   NEURO: Alert, oriented, grossly unremarkable. Neurologically intact.   PSYCH: Cooperative, mood and affect appropriate. VITAL SIGNS: I have reviewed nursing notes and confirm.  CONSTITUTIONAL: Mildly disheveled m in painful distress when moving about.   SKIN:  warm and dry, no acute rash.   HEAD:  normocephalic, atraumatic.  EYES: EOM intact; conjunctiva and sclera clear.  ENT: No nasal discharge; airway clear. Healing abrasion noted to bridge of nose.   NECK: Supple; non tender.  CARD: S1, S2 normal; no murmurs, gallops, or rubs. Regular rate and rhythm.   RESP:  Clear to auscultation b/l, no wheezes, rales or rhonchi.  ABD: Normal bowel sounds; soft; non-distended; non-tender; no guarding/ rebound.  MSK: Normal ROM. No clubbing, cyanosis or edema. 2+ pulses b/l. No CTL spine tenderness. +tenderness to palpation along scapula and left lower lateral rib cage. No ecchymosis or abrasions. No crepitus.   NEURO: Alert, oriented x 3, CN grossly intact. Motor/ sensation intact and equal b/l. Neg pronator drift. Gait steady. Speech clear.  PSYCH: Cooperative, mood and affect appropriate.

## 2023-10-01 NOTE — ED PROVIDER NOTE - CLINICAL SUMMARY MEDICAL DECISION MAKING FREE TEXT BOX
Impression: h/o Hep C, HTN, and opioid abuse (on daily methadone) presents s/p fall 1 week ago with injury to left back and rib cage. Also noted to have low grade temp in ED. VS are otherwise stable. Scapula XR reviewed and negative for acute fracture. CXR shows small pleural effusion and fractures in ribs 5-8. No pneumothorax. CT head, c-spine, chest, and a/p were arranged. CT head/c-spine are negative for acute injury. CT chest and a/p showed fractures to ribs 7-10 and ?fractures to 5-6 that are nondisplaced and subacute in appearance. Also notes trace pleural effusion and increased splenomegaly from baseline. Labs reviewed and show slight WBCs to 13.65, normal H/H, and normal electrolytes. Flu, covid, RSV are negative. Will treat with pain meds. Impression: h/o Hep C, HTN, and opioid abuse (on daily methadone) presents s/p fall 1 week ago with injury to left back and rib cage. Also noted to have low grade temp in ED. VS are otherwise stable. + ttp to left scapula and left lower lateral rib cage with no crepitus. Scapula XR reviewed and negative for acute fracture. CXR shows small pleural effusion and multiple L rib fx's, no pneumothorax. CT head, c-spine neg for acute bleed, fx, dislocation. CT chest, a/p showed fractures to L 5-10th ribs that are nondisplaced and subacute in appearance. No flail chest on exam. Also notes trace pleural effusion and increased splenomegaly from baseline. Labs reviewed and show slight WBCs to 13.65, normal H/H, and normal electrolytes. Flu, covid, RSV are negative. ED evaluation and management discussed with the patient in detail.  Pt is in no resp distress with stable vs. Is ambulatory and tolerating po. Close PMD follow up encouraged.  Strict ED return instructions discussed in detail and patient given the opportunity to ask any questions about their discharge diagnosis and instructions. Patient verbalized understanding.
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
## 2023-10-04 DIAGNOSIS — R16.1 SPLENOMEGALY, NOT ELSEWHERE CLASSIFIED: ICD-10-CM

## 2023-10-04 DIAGNOSIS — Z59.00 HOMELESSNESS UNSPECIFIED: ICD-10-CM
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
## 2023-10-04 DIAGNOSIS — S22.42XA MULTIPLE FRACTURES OF RIBS, LEFT SIDE, INITIAL ENCOUNTER FOR CLOSED FRACTURE: ICD-10-CM

## 2023-10-04 DIAGNOSIS — Z86.74 PERSONAL HISTORY OF SUDDEN CARDIAC ARREST: ICD-10-CM

## 2023-10-04 DIAGNOSIS — M54.9 DORSALGIA, UNSPECIFIED: ICD-10-CM

## 2023-10-04 DIAGNOSIS — S00.31XA ABRASION OF NOSE, INITIAL ENCOUNTER: ICD-10-CM

## 2023-10-04 DIAGNOSIS — R11.0 NAUSEA: ICD-10-CM

## 2023-10-04 DIAGNOSIS — J90 PLEURAL EFFUSION, NOT ELSEWHERE CLASSIFIED: ICD-10-CM

## 2023-10-04 DIAGNOSIS — I10 ESSENTIAL (PRIMARY) HYPERTENSION: ICD-10-CM

## 2023-10-04 DIAGNOSIS — Z20.822 CONTACT WITH AND (SUSPECTED) EXPOSURE TO COVID-19: ICD-10-CM
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
## 2023-10-04 DIAGNOSIS — G89.11 ACUTE PAIN DUE TO TRAUMA: ICD-10-CM

## 2023-10-04 DIAGNOSIS — Z86.59 PERSONAL HISTORY OF OTHER MENTAL AND BEHAVIORAL DISORDERS: ICD-10-CM

## 2023-10-04 DIAGNOSIS — R07.81 PLEURODYNIA: ICD-10-CM

## 2023-10-04 DIAGNOSIS — Z86.19 PERSONAL HISTORY OF OTHER INFECTIOUS AND PARASITIC DISEASES: ICD-10-CM

## 2023-10-04 SDOH — ECONOMIC STABILITY - HOUSING INSECURITY: HOMELESSNESS UNSPECIFIED: Z59.00

## 2023-11-16 NOTE — ED PROVIDER NOTE - DISPOSITION TYPE
"Physical Therapy Treatment    Patient Name: Horacio Gupta  MRN: [id number]  Today's Date: 11/16/2023  Time Calculation  Start Time: 1415  Stop Time: 1500  Time Calculation (min): 45 min      Assessment: Patient presents with increased antalgic gait with short step on L side. Did not increase reps today secondary to increased pain nor did patient perform LE kicks. Able to perform UE core strengthening exercises. No change in pain at end of session, 7/10.        Plan: Continue with core and LE strengthening if next session, progressing if appropriate to allow for improved tolerance to prolonged ambulation.   JW  Treatment/Interventions: Aquatic therapy, Cryotherapy, Hot pack, Education/ Instruction, Manual therapy, Therapeutic exercises  PT Plan: Skilled PT  PT Frequency: 2 times per week  Duration: 2x/wk for 4 weeks or 9 total sessions counting PT eval  Rehab Potential: Good  Plan of Care Agreement: Patient       Current Problem  1. Ankylosing spondylitis of multiple sites in spine (CMS/HCC)  Follow Up In Physical Therapy          Subjective   Patient reports increased pain, 7/10 in L hip. States that he feels he over did it with walking too far, pool exercises and HEP. States that LTR with HEP is painful, advised to stop this exercises. No change in pain at end of session 7/10.      Precautions  Precautions  Precautions Comment: Precautions Comment: Mild Fall Risk.   PMH:  HTN, hx of MI x 2, pacemaker/defibrilator, COPD, emphysema, OA, vascular disease, L THR, L ankle reconstruction. (No e-stim due to pacemaker)  Pain  Pain Assessment: 0-10  Pain Score: 7  Pain Type: Chronic pain    Treatments:  Aquatics: 41'  TrA contraction 10 x 5\" holds  Side Stepping 4 laps  Hip Abd x15 Held  Hip Flex x15 Held  Heel Raises x15   Alt marching x15   Squats x15 small range 11/15  Dumbbell Rolls x20 fwd/bwd   Step Taps x15 Held   UE Paddles Open 2x10  -push/pull, flex/ext, habd/hadd, abd/add  100% unloading 1 large " noodle  -bicycle 5'  -hip abd 5'  -hang 5'  Gradual exit 5'     OP EDUCATION:       Goals:  Active       PT Problem       PT Goal 1       Start:  10/25/23    Expected End:  23       LTG's:    1) Improve Lumbar AROM from  Miller 70% ext and 50% b/l SB  to Miller <= 50% ext and 30% b/l SB in order to facilitate improved gait and mobility.       4-6 weeks      2) Improve Core and B/L hip/ LE strength from 4/5  to >= 4+/5 throughout in order to facilitate safe gait and mobility.         4-6 weeks      3) Improve Modified Oswestry score by >= 5 points in order to improve QOL.     4-6 weeks      4) Improve LBP from  8/10 to <= 3 /10 with activity in order to improve QOL.      4-6 weeks      5) Pt will be able to walk longer distances, lift/carry objects and perform standing activity in the home without significant limitation       4-6 weeks      ST)  Pt/caregiver will be I and consistent with HEP in order to maximize strength and flexibility.    2-3 weeks               DISCHARGE

## 2024-01-12 NOTE — ED BEHAVIORAL HEALTH ASSESSMENT NOTE - ELEVATED CHRONIC RISK
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 Required re-intubation for hypoxic respiratory failure with cardiac arrest on 12/15. Subsequently [facility] to [facility] secondary to poor cardiopulmonary function. De-cannulated . 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 Repeat bronchoscopy on 24 demonstrating: "75% of collapse of mid-trachea on no PEEP." Extubated  to NIMV. Re-intubated on 1/10 for acute hypoxemic respiratory failure and pARDS in the setting of likely airway collapse secondary to known malacia, less likely new infectious process. Ongoing surgical planning for possible tracheostomy vs tracheopexy. Per MD notes.     Discussed with MDs this AM, planning to re-starting enteral feeds.   Feeds had been held at 3am 1/10 after reaching goal rate of 32 mL/hr. Started @ 5 mL/hr 11am today ().    Patient visited at bedside, father present and participating in interview.  # [id number] utilized.     Discussed enteral feeds with dad, per dad mom is having difficulties producing breast milk. Likely feeds will become all/predominantly formula. Dad without nutrition related questions/concerns at this time.     Per RN flowsheets; +2 BM . No recent emesis. 2+ edema face . Skin lesion posterior neck, surgical incision ECMO cannula sites.     Weights:  : 5.72 kg ([facility])  : 5.9 kg (admission)  : 5.84 kg (1+ generalized edema)  : 5.78 kg (per MD still fluid overloaded)    Heights:   : 60 cm ([facility])  : 66 cm (admission)  : 55 cm  Significant discrepancy noted.    Labs:   Na 147 mmol/L<H> Glu 88 mg/dL K+ 3.0 mmol/L<L> Cr <0.20 mg/dL BUN <2 mg/dL<L> Phos n/a        MEDICATIONS  (STANDING):  albuterol  Intermittent Nebulization - Peds 2.5 milliGRAM(s) Nebulizer every 4 hours  cefTRIAXone IV Intermittent - Peds 450 milliGRAM(s) IV Intermittent every 24 hours  chlorhexidine 0.12% Oral Liquid - Peds 15 milliLiter(s) Swish and Spit two times a day  cloNIDine  Oral Liquid - Peds 0.026 milliGRAM(s) Oral every 6 hours  dexMEDEtomidine Infusion - Peds 1.5 MICROgram(s)/kG/Hr (2.21 mL/Hr) IV Continuous <Continuous>  dextrose 5% + sodium chloride 0.45% with potassium chloride 20 mEq/L. - Pediatric 1000 milliLiter(s) (24 mL/Hr) IV Continuous <Continuous>  famotidine  Oral Liquid - Peds 3 milliGRAM(s) Enteral Tube every 12 hours  fentaNYL   Infusion - Peds 3.5 MICROgram(s)/kG/Hr (0.41 mL/Hr) IV Continuous <Continuous>  ferrous sulfate Oral Liquid - Peds 19.5 milliGRAM(s) Elemental Iron Oral daily  furosemide  IV Intermittent - Peds 5.9 milliGRAM(s) IV Intermittent every 12 hours  heparin   Infusion - Pediatric 0.254 Unit(s)/kG/Hr (1.5 mL/Hr) IV Continuous <Continuous>  ipratropium 0.02% for Nebulization - Peds 250 MICROGram(s) Inhalation every 8 hours  ketamine Infusion - Peds 5 MICROgram(s)/kG/Min (0.18 mL/Hr) IV Continuous <Continuous>  levETIRAcetam  Oral Liquid - Peds 60 milliGRAM(s) Oral every 12 hours  LORazepam IV Push - Peds 0.59 milliGRAM(s) IV Push every 6 hours  melatonin Oral Liquid - Peds 1 milliGRAM(s) Oral daily  methadone IV Intermittent - Peds UNDILUTED 1.32 milliGRAM(s) IV Intermittent every 6 hours  sodium chloride 3% for Nebulization - Peds 2 milliLiter(s) Nebulizer every 4 hours    MEDICATIONS  (PRN):  acetaminophen   Rectal Suppository - Peds. 80 milliGRAM(s) Rectal every 6 hours PRN Temp greater or equal to 38 C (100.4 F)  fentaNYL    IV Push - Peds 21 MICROGram(s) IV Push every 1 hour PRN agitation  ibuprofen  Oral Liquid - Peds. 50 milliGRAM(s) Enteral Tube every 6 hours PRN Temp greater or equal to 38 C (100.4 F)  ketamine IV Push - Peds 3 milliGRAM(s) IV Push every 1 hour PRN agitation    Admission Anthropometrics ():  Wt: 5.9 kg, 10%  Length: 66 cm, 81%  Wt-for-length: 1%, z-score: -2.45 *(?) accuracy  *Note using  length of 60 cm from [facility], wt-for-length z-score: 0.05  (WHO Growth Charts)    Updated Anthropometrics ():  Wt: 5.78 kg, 2%  (WHO Growth Charts)    Estimated Energy Needs:   665-723 kcal/day. Based on 115-125 kcal/kg; using  wt of 5.78 kg.    Estimated Protein Needs:  11.6-17.3 g/day. Based on 2-3 g/kg; using  wt of 5.78 kg.    Diet, NPO with Tube Feed - Pediatric:   Tube Feeding Modality: Jejunostomy Tube  Other TF (OTHERTF)  Total Volume for 24 Hours (mL): 768  Continuous  Starting Tube Feed Rate {mL per Hour}: 5  Increase Tube Feed Rate by (mL): 5    Every 4 hours  Until Goal Tube Feed Rate (mL per Hour): 32  Tube Feed Duration (in Hours): 24  Tube Feed Start Time: 10:00  Tube Feeding Instructions:   EHM fortified with Similac Pro Advance to 27cal/oz (90ml EHM + 2 tsp powder) OR Sim Pro Advance at 27cal/oz through the jejunostomy. (24 @ 09:44) [Active]    Nutrition Dx:   1. "Inadequate protein-energy intake related to acute illness as evidenced by NPO status." - improving.  2. "Malnutrition (moderate) related to inability to meet estimated nutrient needs as evidenced by meeting <50% of calorie/protein needs" - ongoing.    Plan/Intervention:  1. Continue with current enteral feeding plan, increase as tolerated to goal;   Similac Pro Advance 27 kcal/oz @ 32 mL/hr x24 hours to provide 768 mL (25.6 oz), 691 kcal (120 kcal/kg), 14.3 g pro (2.5 kcal/kg).   -Can also use EHM fortified to 27 kcal/oz if available (90 mL EHM + 2 tsp Similac powder).   2. Obtain updated weight as able.   3. Please obtain updated length (last length is from November).  4. Monitor diet advancement and tolerance, GI, weights, labs, lytes.    Goal:  Patient to meet >75% estimated needs, tolerating well.     RD to monitor and remain available. 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 Required re-intubation for hypoxic respiratory failure with cardiac arrest on 12/15. Subsequently cann[facility] to [facility] secondary to poor cardiopulmonary function. De-cannulated . 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 Repeat bronchoscopy on 24 demonstrating: "75% of collapse of mid-trachea on no PEEP." Extubated  to NIMV. Re-intubated on 1/10 for acute hypoxemic respiratory failure and pARDS in the setting of likely airway collapse secondary to known malacia, less likely new infectious process. Ongoing surgical planning for possible tracheostomy vs tracheopexy. Per MD notes.     Discussed with MDs this AM, planning to re-starting enteral feeds.   Feeds had been held at 3am 1/10 after reaching goal rate of 32 mL/hr. Started @ 5 mL/hr 11am today ().    Patient visited at bedside, father present and participating in interview.  # [id number] utilized.     Discussed enteral feeds with dad, per dad mom is having difficulties producing breast milk. Likely feeds will become all/predominantly formula. Dad without nutrition related questions/concerns at this time.     Per RN flowsheets; +2 BM . No recent emesis. 2+ edema face . Skin lesion posterior neck, surgical incision ECMO cannula sites.     Weights:  : 5.72 kg ([facility])  : 5.9 kg (admission)  : 5.84 kg (1+ generalized edema)  : 5.78 kg (per MD still fluid overloaded)    Heights:   : 60 cm ([facility])  : 66 cm (admission)  : 55 cm  Significant discrepancy noted.    Labs:   Na 147 mmol/L<H> Glu 88 mg/dL K+ 3.0 mmol/L<L> Cr <0.20 mg/dL BUN <2 mg/dL<L> Phos n/a        MEDICATIONS  (STANDING):  albuterol  Intermittent Nebulization - Peds 2.5 milliGRAM(s) Nebulizer every 4 hours  cefTRIAXone IV Intermittent - Peds 450 milliGRAM(s) IV Intermittent every 24 hours  chlorhexidine 0.12% Oral Liquid - Peds 15 milliLiter(s) Swish and Spit two times a day  cloNIDine  Oral Liquid - Peds 0.026 milliGRAM(s) Oral every 6 hours  dexMEDEtomidine Infusion - Peds 1.5 MICROgram(s)/kG/Hr (2.21 mL/Hr) IV Continuous <Continuous>  dextrose 5% + sodium chloride 0.45% with potassium chloride 20 mEq/L. - Pediatric 1000 milliLiter(s) (24 mL/Hr) IV Continuous <Continuous>  famotidine  Oral Liquid - Peds 3 milliGRAM(s) Enteral Tube every 12 hours  fentaNYL   Infusion - Peds 3.5 MICROgram(s)/kG/Hr (0.41 mL/Hr) IV Continuous <Continuous>  ferrous sulfate Oral Liquid - Peds 19.5 milliGRAM(s) Elemental Iron Oral daily  furosemide  IV Intermittent - Peds 5.9 milliGRAM(s) IV Intermittent every 12 hours  heparin   Infusion - Pediatric 0.254 Unit(s)/kG/Hr (1.5 mL/Hr) IV Continuous <Continuous>  ipratropium 0.02% for Nebulization - Peds 250 MICROGram(s) Inhalation every 8 hours  ketamine Infusion - Peds 5 MICROgram(s)/kG/Min (0.18 mL/Hr) IV Continuous <Continuous>  levETIRAcetam  Oral Liquid - Peds 60 milliGRAM(s) Oral every 12 hours  LORazepam IV Push - Peds 0.59 milliGRAM(s) IV Push every 6 hours  melatonin Oral Liquid - Peds 1 milliGRAM(s) Oral daily  methadone IV Intermittent - Peds UNDILUTED 1.32 milliGRAM(s) IV Intermittent every 6 hours  sodium chloride 3% for Nebulization - Peds 2 milliLiter(s) Nebulizer every 4 hours    MEDICATIONS  (PRN):  acetaminophen   Rectal Suppository - Peds. 80 milliGRAM(s) Rectal every 6 hours PRN Temp greater or equal to 38 C (100.4 F)  fentaNYL    IV Push - Peds 21 MICROGram(s) IV Push every 1 hour PRN agitation  ibuprofen  Oral Liquid - Peds. 50 milliGRAM(s) Enteral Tube every 6 hours PRN Temp greater or equal to 38 C (100.4 F)  ketamine IV Push - Peds 3 milliGRAM(s) IV Push every 1 hour PRN agitation    Admission Anthropometrics ():  Wt: 5.9 kg, 10%  Length: 66 cm, 81%  Wt-for-length: 1%, z-score: -2.45 *(?) accuracy  *Note using  length of 60 cm from [facility], wt-for-length z-score: 0.05  (WHO Growth Charts)    Updated Anthropometrics ():  Wt: 5.78 kg, 2%  (WHO Growth Charts)    Estimated Energy Needs:   665-723 kcal/day. Based on 115-125 kcal/kg; using  wt of 5.78 kg.    Estimated Protein Needs:  11.6-17.3 g/day. Based on 2-3 g/kg; using  wt of 5.78 kg.    Diet, NPO with Tube Feed - Pediatric:   Tube Feeding Modality: Jejunostomy Tube  Other TF (OTHERTF)  Total Volume for 24 Hours (mL): 768  Continuous  Starting Tube Feed Rate {mL per Hour}: 5  Increase Tube Feed Rate by (mL): 5    Every 4 hours  Until Goal Tube Feed Rate (mL per Hour): 32  Tube Feed Duration (in Hours): 24  Tube Feed Start Time: 10:00  Tube Feeding Instructions:   EHM fortified with Similac Pro Advance to 27cal/oz (90ml EHM + 2 tsp powder) OR Sim Pro Advance at 27cal/oz through the jejunostomy. (24 @ 09:44) [Active]    Nutrition Dx:   1. "Inadequate protein-energy intake related to acute illness as evidenced by NPO status." - improving.  2. "Malnutrition (moderate) related to inability to meet estimated nutrient needs as evidenced by meeting <50% of calorie/protein needs" - ongoing.    Plan/Intervention:  1. Continue with current enteral feeding plan, increase as tolerated to goal;   Similac Pro Advance 27 kcal/oz @ 32 mL/hr x24 hours to provide 768 mL (25.6 oz), 691 kcal (120 kcal/kg), 14.3 g pro (2.5 kcal/kg).   -Can also use EHM fortified to 27 kcal/oz if available (90 mL EHM + 2 tsp Similac powder).   2. Obtain updated weight as able.   3. Please obtain updated length (last length is from November).  4. Monitor diet advancement and tolerance, GI, weights, labs, lytes.    Goal:  Patient to meet >75% estimated needs, tolerating well.     RD to monitor and remain available. 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 Required re-intubation for hypoxic respiratory failure with cardiac arrest on 12/15. Subsequently cann[facility] to [facility] secondary to poor cardiopulmonary function. De-cannulated . 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 Repeat bronchoscopy on 24 demonstrating: "75% of collapse of mid-trachea on no PEEP." Extubated  to NIMV. Re-intubated on 1/10 for acute hypoxemic respiratory failure and pARDS in the setting of likely airway collapse secondary to known malacia, less likely new infectious process. Ongoing surgical planning for possible tracheostomy vs tracheopexy. Per MD notes.     Discussed with MDs this AM, planning to re-starting enteral feeds, will keep on concentrated 27 kcal/oz.   Feeds had been held at 3am 1/10 after reaching goal rate of 32 mL/hr. Started @ 5 mL/hr 11am today ().    Patient visited at bedside, father present and participating in interview.  # [id number] utilized.     Discussed enteral feeds with dad, per dad mom is having difficulties producing breast milk. Likely feeds will become all/predominantly formula. Dad without nutrition related questions/concerns at this time.     Per RN flowsheets; +2 BM . No recent emesis. 2+ edema face . Skin lesion posterior neck, surgical incision ECMO cannula sites.     Weights:  : 5.72 kg ([facility])  : 5.9 kg (admission)  : 5.84 kg (1+ generalized edema)  : 5.78 kg (per MD still fluid overloaded)    Heights:   : 60 cm ([facility])  : 66 cm (admission)  : 55 cm  Significant discrepancy noted.    Labs:   Na 147 mmol/L<H> Glu 88 mg/dL K+ 3.0 mmol/L<L> Cr <0.20 mg/dL BUN <2 mg/dL<L> Phos n/a        MEDICATIONS  (STANDING):  albuterol  Intermittent Nebulization - Peds 2.5 milliGRAM(s) Nebulizer every 4 hours  cefTRIAXone IV Intermittent - Peds 450 milliGRAM(s) IV Intermittent every 24 hours  chlorhexidine 0.12% Oral Liquid - Peds 15 milliLiter(s) Swish and Spit two times a day  cloNIDine  Oral Liquid - Peds 0.026 milliGRAM(s) Oral every 6 hours  dexMEDEtomidine Infusion - Peds 1.5 MICROgram(s)/kG/Hr (2.21 mL/Hr) IV Continuous <Continuous>  dextrose 5% + sodium chloride 0.45% with potassium chloride 20 mEq/L. - Pediatric 1000 milliLiter(s) (24 mL/Hr) IV Continuous <Continuous>  famotidine  Oral Liquid - Peds 3 milliGRAM(s) Enteral Tube every 12 hours  fentaNYL   Infusion - Peds 3.5 MICROgram(s)/kG/Hr (0.41 mL/Hr) IV Continuous <Continuous>  ferrous sulfate Oral Liquid - Peds 19.5 milliGRAM(s) Elemental Iron Oral daily  furosemide  IV Intermittent - Peds 5.9 milliGRAM(s) IV Intermittent every 12 hours  heparin   Infusion - Pediatric 0.254 Unit(s)/kG/Hr (1.5 mL/Hr) IV Continuous <Continuous>  ipratropium 0.02% for Nebulization - Peds 250 MICROGram(s) Inhalation every 8 hours  ketamine Infusion - Peds 5 MICROgram(s)/kG/Min (0.18 mL/Hr) IV Continuous <Continuous>  levETIRAcetam  Oral Liquid - Peds 60 milliGRAM(s) Oral every 12 hours  LORazepam IV Push - Peds 0.59 milliGRAM(s) IV Push every 6 hours  melatonin Oral Liquid - Peds 1 milliGRAM(s) Oral daily  methadone IV Intermittent - Peds UNDILUTED 1.32 milliGRAM(s) IV Intermittent every 6 hours  sodium chloride 3% for Nebulization - Peds 2 milliLiter(s) Nebulizer every 4 hours    MEDICATIONS  (PRN):  acetaminophen   Rectal Suppository - Peds. 80 milliGRAM(s) Rectal every 6 hours PRN Temp greater or equal to 38 C (100.4 F)  fentaNYL    IV Push - Peds 21 MICROGram(s) IV Push every 1 hour PRN agitation  ibuprofen  Oral Liquid - Peds. 50 milliGRAM(s) Enteral Tube every 6 hours PRN Temp greater or equal to 38 C (100.4 F)  ketamine IV Push - Peds 3 milliGRAM(s) IV Push every 1 hour PRN agitation    Admission Anthropometrics ():  Wt: 5.9 kg, 10%  Length: 66 cm, 81%  Wt-for-length: 1%, z-score: -2.45 *(?) accuracy  *Note using  length of 60 cm from [facility], wt-for-length z-score: 0.05  (WHO Growth Charts)    Updated Anthropometrics ():  Wt: 5.78 kg, 2%  (WHO Growth Charts)    Estimated Energy Needs:   665-723 kcal/day. Based on 115-125 kcal/kg; using  wt of 5.78 kg.    Estimated Protein Needs:  11.6-17.3 g/day. Based on 2-3 g/kg; using  wt of 5.78 kg.    Diet, NPO with Tube Feed - Pediatric:   Tube Feeding Modality: Jejunostomy Tube  Other TF (OTHERTF)  Total Volume for 24 Hours (mL): 768  Continuous  Starting Tube Feed Rate {mL per Hour}: 5  Increase Tube Feed Rate by (mL): 5    Every 4 hours  Until Goal Tube Feed Rate (mL per Hour): 32  Tube Feed Duration (in Hours): 24  Tube Feed Start Time: 10:00  Tube Feeding Instructions:   EHM fortified with Similac Pro Advance to 27cal/oz (90ml EHM + 2 tsp powder) OR Sim Pro Advance at 27cal/oz through the jejunostomy. (24 @ 09:44) [Active]    Nutrition Dx:   1. "Inadequate protein-energy intake related to acute illness as evidenced by NPO status." - improving.  2. "Malnutrition (moderate) related to inability to meet estimated nutrient needs as evidenced by meeting <50% of calorie/protein needs" - ongoing.    Plan/Intervention:  1. Continue with current enteral feeding plan, increase as tolerated to goal;   Similac Pro Advance 27 kcal/oz @ 32 mL/hr x24 hours to provide 768 mL (25.6 oz), 691 kcal (120 kcal/kg), 14.3 g pro (2.5 kcal/kg).   -Can also use EHM fortified to 27 kcal/oz if available (90 mL EHM + 2 tsp Similac powder).   2. Obtain updated weight as able.   3. Please obtain updated length (last length is from November).  4. Monitor diet advancement and tolerance, GI, weights, labs, lytes.    Goal:  Patient to meet >75% estimated needs, tolerating well.     RD to monitor and remain available. 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 Required re-intubation for hypoxic respiratory failure with cardiac arrest on 12/15. Subsequently cannulated to [facility] secondary to poor cardiopulmonary function. De-cannulated . 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 Repeat bronchoscopy on 24 demonstrating: "75% of collapse of mid-trachea on no PEEP." Extubated  to NIMV. Re-intubated on 1/10 for acute hypoxemic respiratory failure and pARDS in the setting of likely airway collapse secondary to known malacia, less likely new infectious process. Ongoing surgical planning for possible tracheostomy vs tracheopexy. Per MD notes.     Discussed with MDs this AM, planning to re-starting enteral feeds, will keep on concentrated 27 kcal/oz.   Feeds had been held at 3am 1/10 after reaching goal rate of 32 mL/hr. Started @ 5 mL/hr 11am today ().    Patient visited at bedside, father present and participating in interview.  # [id number] utilized.     Discussed enteral feeds with dad, per dad mom is having difficulties producing breast milk. Likely feeds will become all/predominantly formula. Dad without nutrition related questions/concerns at this time.     Per RN flowsheets; +2 BM . No recent emesis. 2+ edema face . Skin lesion posterior neck, surgical incision ECMO cannula sites.     Weights:  : 5.72 kg ([facility][note 3])  : 5.9 kg (admission)  : 5.84 kg (1+ generalized edema)  : 5.78 kg (per MD still fluid overloaded)    Heights:   : 60 cm ([facility][note 3])  : 66 cm (admission)  : 55 cm  Significant discrepancy noted.    Labs:   Na 147 mmol/L<H> Glu 88 mg/dL K+ 3.0 mmol/L<L> Cr <0.20 mg/dL BUN <2 mg/dL<L> Phos n/a        MEDICATIONS  (STANDING):  albuterol  Intermittent Nebulization - Peds 2.5 milliGRAM(s) Nebulizer every 4 hours  cefTRIAXone IV Intermittent - Peds 450 milliGRAM(s) IV Intermittent every 24 hours  chlorhexidine 0.12% Oral Liquid - Peds 15 milliLiter(s) Swish and Spit two times a day  cloNIDine  Oral Liquid - Peds 0.026 milliGRAM(s) Oral every 6 hours  dexMEDEtomidine Infusion - Peds 1.5 MICROgram(s)/kG/Hr (2.21 mL/Hr) IV Continuous <Continuous>  dextrose 5% + sodium chloride 0.45% with potassium chloride 20 mEq/L. - Pediatric 1000 milliLiter(s) (24 mL/Hr) IV Continuous <Continuous>  famotidine  Oral Liquid - Peds 3 milliGRAM(s) Enteral Tube every 12 hours  fentaNYL   Infusion - Peds 3.5 MICROgram(s)/kG/Hr (0.41 mL/Hr) IV Continuous <Continuous>  ferrous sulfate Oral Liquid - Peds 19.5 milliGRAM(s) Elemental Iron Oral daily  furosemide  IV Intermittent - Peds 5.9 milliGRAM(s) IV Intermittent every 12 hours  heparin   Infusion - Pediatric 0.254 Unit(s)/kG/Hr (1.5 mL/Hr) IV Continuous <Continuous>  ipratropium 0.02% for Nebulization - Peds 250 MICROGram(s) Inhalation every 8 hours  ketamine Infusion - Peds 5 MICROgram(s)/kG/Min (0.18 mL/Hr) IV Continuous <Continuous>  levETIRAcetam  Oral Liquid - Peds 60 milliGRAM(s) Oral every 12 hours  LORazepam IV Push - Peds 0.59 milliGRAM(s) IV Push every 6 hours  melatonin Oral Liquid - Peds 1 milliGRAM(s) Oral daily  methadone IV Intermittent - Peds UNDILUTED 1.32 milliGRAM(s) IV Intermittent every 6 hours  sodium chloride 3% for Nebulization - Peds 2 milliLiter(s) Nebulizer every 4 hours    MEDICATIONS  (PRN):  acetaminophen   Rectal Suppository - Peds. 80 milliGRAM(s) Rectal every 6 hours PRN Temp greater or equal to 38 C (100.4 F)  fentaNYL    IV Push - Peds 21 MICROGram(s) IV Push every 1 hour PRN agitation  ibuprofen  Oral Liquid - Peds. 50 milliGRAM(s) Enteral Tube every 6 hours PRN Temp greater or equal to 38 C (100.4 F)  ketamine IV Push - Peds 3 milliGRAM(s) IV Push every 1 hour PRN agitation    Admission Anthropometrics ():  Wt: 5.9 kg, 10%  Length: 66 cm, 81%  Wt-for-length: 1%, z-score: -2.45 *(?) accuracy  *Note using  length of 60 cm from [facility][note 3], wt-for-length z-score: 0.05  (WHO Growth Charts)    Updated Anthropometrics ():  Wt: 5.78 kg, 2%  (WHO Growth Charts)    Estimated Energy Needs:   665-723 kcal/day. Based on 115-125 kcal/kg; using  wt of 5.78 kg.    Estimated Protein Needs:  11.6-17.3 g/day. Based on 2-3 g/kg; using  wt of 5.78 kg.    Diet, NPO with Tube Feed - Pediatric:   Tube Feeding Modality: Jejunostomy Tube  Other TF (OTHERTF)  Total Volume for 24 Hours (mL): 768  Continuous  Starting Tube Feed Rate {mL per Hour}: 5  Increase Tube Feed Rate by (mL): 5    Every 4 hours  Until Goal Tube Feed Rate (mL per Hour): 32  Tube Feed Duration (in Hours): 24  Tube Feed Start Time: 10:00  Tube Feeding Instructions:   EHM fortified with Similac Pro Advance to 27cal/oz (90ml EHM + 2 tsp powder) OR Sim Pro Advance at 27cal/oz through the jejunostomy. (24 @ 09:44) [Active]    Nutrition Dx:   1. "Inadequate protein-energy intake related to acute illness as evidenced by NPO status." - improving.  2. "Malnutrition (moderate) related to inability to meet estimated nutrient needs as evidenced by meeting <50% of calorie/protein needs" - ongoing.    Plan/Intervention:  1. Continue with current enteral feeding plan, increase as tolerated to goal;   Similac Pro Advance 27 kcal/oz @ 32 mL/hr x24 hours to provide 768 mL (25.6 oz), 691 kcal (120 kcal/kg), 14.3 g pro (2.5 kcal/kg).   -Can also use EHM fortified to 27 kcal/oz if available (90 mL EHM + 2 tsp Similac powder).   2. Obtain updated weight as able.   3. Please obtain updated length (last length is from November).  4. Monitor diet advancement and tolerance, GI, weights, labs, lytes.    Goal:  Patient to meet >75% estimated needs, tolerating well.     RD to monitor and remain available. 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 Required re-intubation for hypoxic respiratory failure with cardiac arrest on 12/15. Subsequently cann[facility] to [facility] secondary to poor cardiopulmonary function. De-cannulated . 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 Repeat bronchoscopy on 24 demonstrating: "75% of collapse of mid-trachea on no PEEP." Extubated  to NIMV. Re-intubated on 1/10 for acute hypoxemic respiratory failure and pARDS in the setting of likely airway collapse secondary to known malacia, less likely new infectious process. Ongoing surgical planning for possible tracheostomy vs tracheopexy. Per MD notes.     Discussed with MDs this AM, planning to re-start enteral feeds, will keep on concentrated 27 kcal/oz.   Feeds had been held at 3am 1/10 after reaching goal rate of 32 mL/hr. Started @ 5 mL/hr 11am today ().    Patient visited at bedside, father present and participating in interview.  # [id number] utilized.     Discussed enteral feeds with dad, per dad mom is having difficulties producing breast milk. Likely feeds will become all/predominantly formula. Dad without nutrition related questions/concerns at this time.     Per RN flowsheets; +2 BM . No recent emesis. 2+ edema face . Skin lesion posterior neck, surgical incision ECMO cannula sites.     Weights:  : 5.72 kg ([facility])  : 5.9 kg (admission)  : 5.84 kg (1+ generalized edema)  : 5.78 kg (per MD still fluid overloaded)    Heights:   : 60 cm ([facility])  : 66 cm (admission)  : 55 cm  Significant discrepancy noted.    Labs:   Na 147 mmol/L<H> Glu 88 mg/dL K+ 3.0 mmol/L<L> Cr <0.20 mg/dL BUN <2 mg/dL<L> Phos n/a        MEDICATIONS  (STANDING):  albuterol  Intermittent Nebulization - Peds 2.5 milliGRAM(s) Nebulizer every 4 hours  cefTRIAXone IV Intermittent - Peds 450 milliGRAM(s) IV Intermittent every 24 hours  chlorhexidine 0.12% Oral Liquid - Peds 15 milliLiter(s) Swish and Spit two times a day  cloNIDine  Oral Liquid - Peds 0.026 milliGRAM(s) Oral every 6 hours  dexMEDEtomidine Infusion - Peds 1.5 MICROgram(s)/kG/Hr (2.21 mL/Hr) IV Continuous <Continuous>  dextrose 5% + sodium chloride 0.45% with potassium chloride 20 mEq/L. - Pediatric 1000 milliLiter(s) (24 mL/Hr) IV Continuous <Continuous>  famotidine  Oral Liquid - Peds 3 milliGRAM(s) Enteral Tube every 12 hours  fentaNYL   Infusion - Peds 3.5 MICROgram(s)/kG/Hr (0.41 mL/Hr) IV Continuous <Continuous>  ferrous sulfate Oral Liquid - Peds 19.5 milliGRAM(s) Elemental Iron Oral daily  furosemide  IV Intermittent - Peds 5.9 milliGRAM(s) IV Intermittent every 12 hours  heparin   Infusion - Pediatric 0.254 Unit(s)/kG/Hr (1.5 mL/Hr) IV Continuous <Continuous>  ipratropium 0.02% for Nebulization - Peds 250 MICROGram(s) Inhalation every 8 hours  ketamine Infusion - Peds 5 MICROgram(s)/kG/Min (0.18 mL/Hr) IV Continuous <Continuous>  levETIRAcetam  Oral Liquid - Peds 60 milliGRAM(s) Oral every 12 hours  LORazepam IV Push - Peds 0.59 milliGRAM(s) IV Push every 6 hours  melatonin Oral Liquid - Peds 1 milliGRAM(s) Oral daily  methadone IV Intermittent - Peds UNDILUTED 1.32 milliGRAM(s) IV Intermittent every 6 hours  sodium chloride 3% for Nebulization - Peds 2 milliLiter(s) Nebulizer every 4 hours    MEDICATIONS  (PRN):  acetaminophen   Rectal Suppository - Peds. 80 milliGRAM(s) Rectal every 6 hours PRN Temp greater or equal to 38 C (100.4 F)  fentaNYL    IV Push - Peds 21 MICROGram(s) IV Push every 1 hour PRN agitation  ibuprofen  Oral Liquid - Peds. 50 milliGRAM(s) Enteral Tube every 6 hours PRN Temp greater or equal to 38 C (100.4 F)  ketamine IV Push - Peds 3 milliGRAM(s) IV Push every 1 hour PRN agitation    Admission Anthropometrics ():  Wt: 5.9 kg, 10%  Length: 66 cm, 81%  Wt-for-length: 1%, z-score: -2.45 *(?) accuracy  *Note using  length of 60 cm from [facility], wt-for-length z-score: 0.05  (WHO Growth Charts)    Updated Anthropometrics ():  Wt: 5.78 kg, 2%  (WHO Growth Charts)    Estimated Energy Needs:   665-723 kcal/day. Based on 115-125 kcal/kg; using  wt of 5.78 kg.    Estimated Protein Needs:  11.6-17.3 g/day. Based on 2-3 g/kg; using  wt of 5.78 kg.    Diet, NPO with Tube Feed - Pediatric:   Tube Feeding Modality: Jejunostomy Tube  Other TF (OTHERTF)  Total Volume for 24 Hours (mL): 768  Continuous  Starting Tube Feed Rate {mL per Hour}: 5  Increase Tube Feed Rate by (mL): 5    Every 4 hours  Until Goal Tube Feed Rate (mL per Hour): 32  Tube Feed Duration (in Hours): 24  Tube Feed Start Time: 10:00  Tube Feeding Instructions:   EHM fortified with Similac Pro Advance to 27cal/oz (90ml EHM + 2 tsp powder) OR Sim Pro Advance at 27cal/oz through the jejunostomy. (24 @ 09:44) [Active]    Nutrition Dx:   1. "Inadequate protein-energy intake related to acute illness as evidenced by NPO status." - improving.  2. "Malnutrition (moderate) related to inability to meet estimated nutrient needs as evidenced by meeting <50% of calorie/protein needs" - ongoing.    Plan/Intervention:  1. Continue with current enteral feeding plan, increase as tolerated to goal;   Similac Pro Advance 27 kcal/oz @ 32 mL/hr x24 hours to provide 768 mL (25.6 oz), 691 kcal (120 kcal/kg), 14.3 g pro (2.5 kcal/kg).   -Can also use EHM fortified to 27 kcal/oz if available (90 mL EHM + 2 tsp Similac powder).   2. Obtain updated weight as able.   3. Please obtain updated length (last length is from November).  4. Monitor diet advancement and tolerance, GI, weights, labs, lytes.    Goal:  Patient to meet >75% estimated needs, tolerating well.     RD to monitor and remain available. 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 Required re-intubation for hypoxic respiratory failure with cardiac arrest on 12/15. Subsequently cann[facility] secondary to poor cardiopulmonary function. De-cannulated . 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 Repeat bronchoscopy on 24 demonstrating: "75% of collapse of mid-trachea on no PEEP." Extubated  to NIMV. Re-intubated on 1/10 for acute hypoxemic respiratory failure and pARDS in the setting of likely airway collapse secondary to known malacia, less likely new infectious process. Ongoing surgical planning for possible tracheostomy vs tracheopexy. Per MD notes.     Discussed with MDs this AM, planning to re-start enteral feeds, will keep on concentrated 27 kcal/oz.   Feeds had been held at 3am 1/10 after reaching goal rate of 32 mL/hr. Started @ 5 mL/hr 11am today ().    Patient visited at bedside, father present and participating in interview.  # [id number] utilized.     Discussed enteral feeds with dad, per dad mom is having difficulties producing breast milk. Likely feeds will become all/predominantly formula. Dad without nutrition related questions/concerns at this time.     Per RN flowsheets; +2 BM . No recent emesis. 2+ edema face . Skin lesion posterior neck, surgical incision ECMO cannula sites.     Weights:  : 5.72 kg ([facility])  : 5.9 kg (admission)  : 5.84 kg (1+ generalized edema)  : 5.78 kg (per MD still fluid overloaded)    Heights:   : 60 cm ([facility])  : 66 cm (admission)  : 55 cm  Significant discrepancy noted.    Labs:   Na 147 mmol/L<H> Glu 88 mg/dL K+ 3.0 mmol/L<L> Cr <0.20 mg/dL BUN <2 mg/dL<L> Phos n/a        MEDICATIONS  (STANDING):  albuterol  Intermittent Nebulization - Peds 2.5 milliGRAM(s) Nebulizer every 4 hours  cefTRIAXone IV Intermittent - Peds 450 milliGRAM(s) IV Intermittent every 24 hours  chlorhexidine 0.12% Oral Liquid - Peds 15 milliLiter(s) Swish and Spit two times a day  cloNIDine  Oral Liquid - Peds 0.026 milliGRAM(s) Oral every 6 hours  dexMEDEtomidine Infusion - Peds 1.5 MICROgram(s)/kG/Hr (2.21 mL/Hr) IV Continuous <Continuous>  dextrose 5% + sodium chloride 0.45% with potassium chloride 20 mEq/L. - Pediatric 1000 milliLiter(s) (24 mL/Hr) IV Continuous <Continuous>  famotidine  Oral Liquid - Peds 3 milliGRAM(s) Enteral Tube every 12 hours  fentaNYL   Infusion - Peds 3.5 MICROgram(s)/kG/Hr (0.41 mL/Hr) IV Continuous <Continuous>  ferrous sulfate Oral Liquid - Peds 19.5 milliGRAM(s) Elemental Iron Oral daily  furosemide  IV Intermittent - Peds 5.9 milliGRAM(s) IV Intermittent every 12 hours  heparin   Infusion - Pediatric 0.254 Unit(s)/kG/Hr (1.5 mL/Hr) IV Continuous <Continuous>  ipratropium 0.02% for Nebulization - Peds 250 MICROGram(s) Inhalation every 8 hours  ketamine Infusion - Peds 5 MICROgram(s)/kG/Min (0.18 mL/Hr) IV Continuous <Continuous>  levETIRAcetam  Oral Liquid - Peds 60 milliGRAM(s) Oral every 12 hours  LORazepam IV Push - Peds 0.59 milliGRAM(s) IV Push every 6 hours  melatonin Oral Liquid - Peds 1 milliGRAM(s) Oral daily  methadone IV Intermittent - Peds UNDILUTED 1.32 milliGRAM(s) IV Intermittent every 6 hours  sodium chloride 3% for Nebulization - Peds 2 milliLiter(s) Nebulizer every 4 hours    MEDICATIONS  (PRN):  acetaminophen   Rectal Suppository - Peds. 80 milliGRAM(s) Rectal every 6 hours PRN Temp greater or equal to 38 C (100.4 F)  fentaNYL    IV Push - Peds 21 MICROGram(s) IV Push every 1 hour PRN agitation  ibuprofen  Oral Liquid - Peds. 50 milliGRAM(s) Enteral Tube every 6 hours PRN Temp greater or equal to 38 C (100.4 F)  ketamine IV Push - Peds 3 milliGRAM(s) IV Push every 1 hour PRN agitation    Admission Anthropometrics ():  Wt: 5.9 kg, 10%  Length: 66 cm, 81%  Wt-for-length: 1%, z-score: -2.45 *(?) accuracy  *Note using  length of 60 cm from [facility], wt-for-length z-score: 0.05  (WHO Growth Charts)    Updated Anthropometrics ():  Wt: 5.78 kg, 2%  (WHO Growth Charts)    Estimated Energy Needs:   665-723 kcal/day. Based on 115-125 kcal/kg; using  wt of 5.78 kg.    Estimated Protein Needs:  11.6-17.3 g/day. Based on 2-3 g/kg; using  wt of 5.78 kg.    Diet, NPO with Tube Feed - Pediatric:   Tube Feeding Modality: Jejunostomy Tube  Other TF (OTHERTF)  Total Volume for 24 Hours (mL): 768  Continuous  Starting Tube Feed Rate {mL per Hour}: 5  Increase Tube Feed Rate by (mL): 5    Every 4 hours  Until Goal Tube Feed Rate (mL per Hour): 32  Tube Feed Duration (in Hours): 24  Tube Feed Start Time: 10:00  Tube Feeding Instructions:   EHM fortified with Similac Pro Advance to 27cal/oz (90ml EHM + 2 tsp powder) OR Sim Pro Advance at 27cal/oz through the jejunostomy. (24 @ 09:44) [Active]    Nutrition Dx:   1. "Inadequate protein-energy intake related to acute illness as evidenced by NPO status." - improving.  2. "Malnutrition (moderate) related to inability to meet estimated nutrient needs as evidenced by meeting <50% of calorie/protein needs" - ongoing.    Plan/Intervention:  1. Continue with current enteral feeding plan, increase as tolerated to goal;   Similac Pro Advance 27 kcal/oz @ 32 mL/hr x24 hours to provide 768 mL (25.6 oz), 691 kcal (120 kcal/kg), 14.3 g pro (2.5 kcal/kg).   -Can also use EHM fortified to 27 kcal/oz if available (90 mL EHM + 2 tsp Similac powder).   2. Obtain updated weight as able.   3. Please obtain updated length (last length is from November).  4. Monitor diet advancement and tolerance, GI, weights, labs, lytes.    Goal:  Patient to meet >75% estimated needs, tolerating well.     RD to monitor and remain available. - Niyah Vázquez MS RD, pager #[phone] No

## 2024-01-19 NOTE — ED ADULT TRIAGE NOTE - WEIGHT IN KG
"SUBJECTIVE:  Lidia Gallegos is a 30 year old  at 15w4d weeks by LMP with an Estimated Date of Delivery: 2024.    Concerns: none, would like genetic testing, will do NIPS, wants gender, but wants  to know so they can have a gender reveal.  Mood stable.  Endorses congestion  Denies HA, changes in vision, CP, SOB, N/V/D/C, abd px/contractions, LOF, VB, changes in discharge, peripheral edema.  Fetal movement is not expected to be present at this stage.     OBJECTIVE:  Vitals:    24 1331   BP: 132/88   Pulse: 88   Resp: 20   Temp: 97.5  F (36.4  C)   TempSrc: Temporal   SpO2: 99%   Weight: 126.6 kg (279 lb)   Height: 1.805 m (5' 11.06\")     See Ob vitals for exam    ASSESSMENT/PLAN:  1. Supervision of high risk pregnancy, antepartum    2. Adult BMI 38.0-38.9 kg/sq m    3. Major depressive disorder, recurrent episode, moderate (H)    4. Nasal congestion       Orders Placed This Encounter   Procedures    Non Invasive Prenatal Test Cell Free DNA ([facility] / [employer])   Flonase for nasal congestion.  Requests male provider, able to have scheduled with Dr. Dee.  RTC in 4 weeks or sooner with problems    Sahara Maravilla,    "
69.9

## 2024-04-15 ENCOUNTER — EMERGENCY (EMERGENCY)
Facility: HOSPITAL | Age: 54
LOS: 1 days | Discharge: HOME | End: 2024-04-15
Attending: STUDENT IN AN ORGANIZED HEALTH CARE EDUCATION/TRAINING PROGRAM | Admitting: EMERGENCY MEDICINE
Payer: MEDICAID

## 2024-04-15 VITALS
RESPIRATION RATE: 18 BRPM | HEART RATE: 96 BPM | DIASTOLIC BLOOD PRESSURE: 72 MMHG | OXYGEN SATURATION: 95 % | TEMPERATURE: 101 F | WEIGHT: 139.99 LBS | SYSTOLIC BLOOD PRESSURE: 134 MMHG | HEIGHT: 69 IN

## 2024-04-15 DIAGNOSIS — R10.9 UNSPECIFIED ABDOMINAL PAIN: ICD-10-CM

## 2024-04-15 DIAGNOSIS — R19.7 DIARRHEA, UNSPECIFIED: ICD-10-CM

## 2024-04-15 DIAGNOSIS — R74.01 ELEVATION OF LEVELS OF LIVER TRANSAMINASE LEVELS: ICD-10-CM

## 2024-04-15 DIAGNOSIS — Z90.49 ACQUIRED ABSENCE OF OTHER SPECIFIED PARTS OF DIGESTIVE TRACT: ICD-10-CM

## 2024-04-15 DIAGNOSIS — R68.83 CHILLS (WITHOUT FEVER): ICD-10-CM

## 2024-04-15 DIAGNOSIS — I10 ESSENTIAL (PRIMARY) HYPERTENSION: ICD-10-CM

## 2024-04-15 DIAGNOSIS — R11.2 NAUSEA WITH VOMITING, UNSPECIFIED: ICD-10-CM

## 2024-04-15 DIAGNOSIS — R52 PAIN, UNSPECIFIED: ICD-10-CM

## 2024-04-15 PROCEDURE — 99285 EMERGENCY DEPT VISIT HI MDM: CPT

## 2024-04-15 RX ORDER — SODIUM CHLORIDE 9 MG/ML
1000 INJECTION INTRAMUSCULAR; INTRAVENOUS; SUBCUTANEOUS ONCE
Refills: 0 | Status: COMPLETED | OUTPATIENT
Start: 2024-04-15 | End: 2024-04-15

## 2024-04-15 RX ORDER — ONDANSETRON 8 MG/1
4 TABLET, FILM COATED ORAL ONCE
Refills: 0 | Status: COMPLETED | OUTPATIENT
Start: 2024-04-15 | End: 2024-04-15

## 2024-04-15 RX ORDER — FAMOTIDINE 10 MG/ML
20 INJECTION INTRAVENOUS ONCE
Refills: 0 | Status: COMPLETED | OUTPATIENT
Start: 2024-04-15 | End: 2024-04-15

## 2024-04-15 RX ORDER — SODIUM CHLORIDE 9 MG/ML
1000 INJECTION, SOLUTION INTRAVENOUS ONCE
Refills: 0 | Status: COMPLETED | OUTPATIENT
Start: 2024-04-15 | End: 2024-04-15

## 2024-04-15 RX ADMIN — ONDANSETRON 4 MILLIGRAM(S): 8 TABLET, FILM COATED ORAL at 22:13

## 2024-04-15 RX ADMIN — FAMOTIDINE 20 MILLIGRAM(S): 10 INJECTION INTRAVENOUS at 22:13

## 2024-04-15 RX ADMIN — SODIUM CHLORIDE 2000 MILLILITER(S): 9 INJECTION INTRAMUSCULAR; INTRAVENOUS; SUBCUTANEOUS at 22:09

## 2024-04-15 NOTE — ED ADULT NURSE NOTE - OBJECTIVE STATEMENT
pt c/o diffuse abdominal pain, nausea, vomiting and diarrhea since yesterday. as per pt, pt has been unable to tolerate PO, and states that he is extremely dehydrated. lungs clear bilaterally upon auscultation. no use of accessory muscles noted. Denies any chest pain or discomfort at this time. .no active vomiting at this time. abdomen is soft nondistended.

## 2024-04-16 VITALS
SYSTOLIC BLOOD PRESSURE: 137 MMHG | TEMPERATURE: 98 F | RESPIRATION RATE: 18 BRPM | DIASTOLIC BLOOD PRESSURE: 84 MMHG | OXYGEN SATURATION: 96 % | HEART RATE: 74 BPM

## 2024-04-16 PROCEDURE — 36415 COLL VENOUS BLD VENIPUNCTURE: CPT

## 2024-04-16 PROCEDURE — 96374 THER/PROPH/DIAG INJ IV PUSH: CPT | Mod: XU

## 2024-04-16 PROCEDURE — 99284 EMERGENCY DEPT VISIT MOD MDM: CPT | Mod: 25

## 2024-04-16 PROCEDURE — 80053 COMPREHEN METABOLIC PANEL: CPT

## 2024-04-16 PROCEDURE — 96375 TX/PRO/DX INJ NEW DRUG ADDON: CPT

## 2024-04-16 PROCEDURE — 74177 CT ABD & PELVIS W/CONTRAST: CPT | Mod: MC

## 2024-04-16 PROCEDURE — 83690 ASSAY OF LIPASE: CPT

## 2024-04-16 PROCEDURE — 85025 COMPLETE CBC W/AUTO DIFF WBC: CPT

## 2024-04-16 PROCEDURE — 70450 CT HEAD/BRAIN W/O DYE: CPT | Mod: 26,MC

## 2024-04-16 PROCEDURE — 74177 CT ABD & PELVIS W/CONTRAST: CPT | Mod: 26,MC

## 2024-04-16 PROCEDURE — 70450 CT HEAD/BRAIN W/O DYE: CPT | Mod: MC

## 2024-04-16 NOTE — ED PROVIDER NOTE - CLINICAL SUMMARY MEDICAL DECISION MAKING FREE TEXT BOX
history of htn, hepC, on daily methadone, s/p appy, w 24 hours of gi symptoms. diffuse abdominal cramping. two episodes of nonbloody emesis. few episodes of nonbloody diarrhea. feels dehydrated. chills, body aches. also requesting CT head for head injury a few weeks ago.  pt nontoxic appearing, temp 100. vitals otherwise ok, exam unremarkable - no abd tenderness  suspect gi / viral illness, possible gastroenteritis, colitis, gastritis, pancreatitis  r/o intra-abdominal pathology ie sbo, diverticulitis w ct imaging  assess for anemia, electrolyte derangement, dehydration  plan - labs, ctap  iv hydration, antiemetics prn  serial abd exams

## 2024-04-16 NOTE — ED PROVIDER NOTE - PROGRESS NOTE DETAILS
lipase 121. slightly elevated LFTs ast 46 / alt 48.   CT w/o acute findings. ct head ok  after fluids / meds pt feeling better. rpt abd exam benign. tolerating po. ok for dc and outpt follow up    All results reviewed with the patient verbally. Discharge plan and return precautions d/w pt who verbalized understanding and agrees with plan. All questions answered. Vitals WNL. Ready for d/c.

## 2024-04-16 NOTE — ED PROVIDER NOTE - PATIENT PORTAL LINK FT
You can access the FollowMyHealth Patient Portal offered by [facility] by registering at the following website: [email or web address]. By joining [facility]’s FollowMyHealth portal, you will also be able to view your health information using other applications (apps) compatible with our system.

## 2024-04-16 NOTE — ED PROVIDER NOTE - ATTENDING SHARED VISIT SELECTOR YES
Quality 111:Pneumonia Vaccination Status For Older Adults: Pneumococcal vaccine was not administered on or after patient’s 60th birthday and before the end of the measurement period, reason not otherwise specified Quality 110: Preventive Care And Screening: Influenza Immunization: Influenza Immunization not Administered because Patient Refused. Detail Level: Detailed Yes

## 2024-04-16 NOTE — ED PROVIDER NOTE - PHYSICAL EXAMINATION
Constitutional : non-toxic, no acute distress. awake, alert, oriented to person, place, time/situation.  Head : head normocephalic, atraumatic  EENMT : eyes clear bilaterally, PERRL, EOMI. airway patent. moist mucous membranes. neck supple.  Cardiac : Normal rate, regular rhythm. No murmur appreciated, no LE edema.  Resp : Breath sounds clear and equal bilaterally. Respirations even and unlabored.   Gastro : abdomen soft, nontender, nondistended. no rebound or guarding. no CVAT.  MSK :  range of motion is not limited, no muscle or joint tenderness  Back : No evidence of trauma. No spinal or CVA tenderness.  Vasc : Extremities warm and well perfused. 2+ radial and DP pulses. cap refill <2 seconds  Neuro : Alert and oriented, CNII-XII grossly intact, no motor or sensory deficits.  Skin : Skin normal color for race, warm, dry and intact. No evidence of rash.  Psych : Alert and oriented to person, place, time/situation. normal mood and affect. no apparent risk to self or others.

## 2024-04-16 NOTE — ED PROVIDER NOTE - OBJECTIVE STATEMENT
54 yr old male, history of htn, hepC, on daily methadone, s/p appy, presents to the Emergency Department w abdominal pain. pt w 24 hours of gi symptoms. diffuse abdominal cramping. two episodes of nonbloody emesis. few episodes of nonbloody diarrhea. feels dehydrated. chills, body aches.   no cp, sob, uri sx, urinary symptoms, headache, dizziness.   also notes head injury a few weeks ago, does not want to elaborate further but requesting ct of his head to make sure there isnt any damage.

## 2024-09-18 NOTE — ED ADULT TRIAGE NOTE - HEIGHT IN FEET
No Refill Protocol on File:    Medication/Dose: hydrocodone   Patient last seen by PCP: 12/15/23  Next office visit with PCP: none  Last Lab:12/15/23  Last Ordered: 8/19/24  Last dispensed 8/19/24    Above information requires contacting patient: No    PDMP reviewed and documented    Sent to provider to approve or deny    Patient due for appointment and urine drug screen?     
Patient called today with regards to the following prescription request: Current    Provider: Kolton Watkins MD  Medication: Hydrocodone    Pharmacy:       [id number] [place] [address] [place] [address] [address] [facility] [address]  Phone: [phone] Fax: [phone]          For additional information, or if you need to contact the caller at the following number:    Contact Phone Number:   Mobile [phone]       
5

## 2024-10-28 NOTE — ED ADULT TRIAGE NOTE - NS ED NURSE BANDS TYPE
Name band; OT IE completed. Orders received, chart reviewed, pt cleared for OT by FRANCOISE Bowie (cardiac). Pt received semi supine in bed, NAD, +IV, +tele. Pt A&Ox4, agreeable to OT, and tolerated session well.

## 2024-11-11 NOTE — ED ADULT TRIAGE NOTE - NSWEIGHTCALCTOOLDRUG_GEN_A_CORE
[Shortness Of Breath] : shortness of breath [Wheezing] : wheezing [Cough] : cough [SOB on Exertion] : shortness of breath during exertion  used [Heartburn] : heartburn [Joint Pain] : joint pain [Anxiety] : anxiety [Chest Pain] : no chest pain [Palpitations] : no palpitations [Constipation] : no constipation [Dysuria] : no dysuria [Incontinence] : no incontinence [Skin Lesions] : no skin lesions

## 2025-01-28 NOTE — ED ADULT NURSE NOTE - EXTENSIONS OF SELF_ADULT
Recommend regular physical activity (150 minutes of moderate intensity exercise) and 10,000 steps a day.  Heart healthy Mediterranean diet.  Obtain a fasting lipid panel before you see Dr Jordan when you come for your echo.  
None

## 2025-03-05 NOTE — ED PROVIDER NOTE - OBJECTIVE STATEMENT
Type of Encounter: Initial and Discharge [facility] Candidate  Call completed with: Patient  CHW spoke to the patient, introduced self, role and informed nature of the call.  PT stated she sometimes has the need to meet her monthly food supply. CHW asked if she has applied for or receives foodshare.  Pt stated she is over the income limit.  CHW informed she could send her information for food pantries and/or meal sites if she is interested.  Pt confirmed she is and asked the information be sent to her via email.    Issues addressed: Food Security  CHW provided patient with the following resources: Meal Sites  Food Pantries by zip code for [facility]    Patient agrees to: N/A  CHW agrees to: N/A    Next CHW encounter: Removing self from the Care Team.  The outcome of the [facility] High Risk outreach call: Support completed    52M PMH HCV, PTSD, bipolar disorder, polysubstance abuse (opioid use - on methadone 120 mg daily, benzodiazepines, cocaine, amphetamines, ETOH), p/w several complaints. Pt has subjective fevers since yesterday. Also body aches. Also bitemporal HA, gradual onset, similar to prior, since yesterday (triage notes 1w). Also chills for 6 months. On ROS pt notes minimal R flank pain x1w. No other systemic symptoms.   Last etoh 2d ago. States last IVDU (heroin) was ~1mo ago. Not on any daily meds.   Denies vision changes, focal weakness/numbness, vertigo, neck pain, back pain, rashes, tinnitus, hearing changes, URI symptoms, SOB/CP, NVD, abd pain, urinary complaints, black/bloody stool.